# Patient Record
Sex: FEMALE | Race: WHITE | Employment: OTHER | URBAN - METROPOLITAN AREA
[De-identification: names, ages, dates, MRNs, and addresses within clinical notes are randomized per-mention and may not be internally consistent; named-entity substitution may affect disease eponyms.]

---

## 2023-02-09 ENCOUNTER — APPOINTMENT (OUTPATIENT)
Dept: CT IMAGING | Age: 88
DRG: 661 | End: 2023-02-09
Payer: MEDICARE

## 2023-02-09 ENCOUNTER — HOSPITAL ENCOUNTER (INPATIENT)
Age: 88
LOS: 3 days | Discharge: HOME | DRG: 661 | End: 2023-02-12
Attending: FAMILY MEDICINE | Admitting: FAMILY MEDICINE
Payer: MEDICARE

## 2023-02-09 ENCOUNTER — HOSPITAL ENCOUNTER (EMERGENCY)
Age: 88
Discharge: ANOTHER HOSPITAL | DRG: 661 | End: 2023-02-09
Attending: EMERGENCY MEDICINE
Payer: MEDICARE

## 2023-02-09 VITALS
BODY MASS INDEX: 21.97 KG/M2 | RESPIRATION RATE: 22 BRPM | HEART RATE: 109 BPM | SYSTOLIC BLOOD PRESSURE: 139 MMHG | TEMPERATURE: 98.4 F | DIASTOLIC BLOOD PRESSURE: 73 MMHG | OXYGEN SATURATION: 92 % | WEIGHT: 140 LBS | HEIGHT: 67 IN

## 2023-02-09 DIAGNOSIS — N13.2 HYDRONEPHROSIS WITH RENAL AND URETERAL CALCULUS OBSTRUCTION: Primary | ICD-10-CM

## 2023-02-09 PROBLEM — E78.5 HLD (HYPERLIPIDEMIA): Status: ACTIVE | Noted: 2023-02-09

## 2023-02-09 PROBLEM — E11.9 DIABETES MELLITUS, TYPE II (HCC): Status: ACTIVE | Noted: 2023-02-09

## 2023-02-09 PROBLEM — N13.8 URINARY TRACT OBSTRUCTION BY KIDNEY STONE: Status: ACTIVE | Noted: 2023-02-09

## 2023-02-09 PROBLEM — N20.0 URINARY TRACT OBSTRUCTION BY KIDNEY STONE: Status: ACTIVE | Noted: 2023-02-09

## 2023-02-09 PROBLEM — E03.9 HYPOTHYROIDISM: Status: ACTIVE | Noted: 2023-02-09

## 2023-02-09 LAB
ALBUMIN SERPL-MCNC: 3.9 G/DL (ref 3.2–4.6)
ALBUMIN/GLOB SERPL: 1.5 (ref 0.4–1.6)
ALP SERPL-CCNC: 79 U/L (ref 45–117)
ALT SERPL-CCNC: 8 U/L (ref 13–61)
ANION GAP SERPL CALC-SCNC: 9 MMOL/L (ref 2–11)
APPEARANCE UR: CLEAR
AST SERPL-CCNC: 12 U/L (ref 15–37)
BACTERIA URNS QL MICRO: ABNORMAL /HPF
BASOPHILS # BLD: 0 K/UL (ref 0–0.2)
BASOPHILS NFR BLD: 0 % (ref 0–2)
BILIRUB SERPL-MCNC: 0.6 MG/DL (ref 0.2–1.1)
BILIRUB UR QL: NEGATIVE
BUN SERPL-MCNC: 16 MG/DL (ref 7–18)
CALCIUM SERPL-MCNC: 10 MG/DL (ref 8.3–10.4)
CASTS URNS QL MICRO: 0 /LPF
CHLORIDE SERPL-SCNC: 101 MMOL/L (ref 98–107)
CO2 SERPL-SCNC: 32 MMOL/L (ref 21–32)
COLOR UR: YELLOW
CREAT SERPL-MCNC: 0.85 MG/DL (ref 0.6–1)
CRYSTALS URNS QL MICRO: 0 /LPF
DIFFERENTIAL METHOD BLD: ABNORMAL
EOSINOPHIL # BLD: 0.1 K/UL (ref 0–0.8)
EOSINOPHIL NFR BLD: 1 % (ref 0.5–7.8)
EPI CELLS #/AREA URNS HPF: ABNORMAL /HPF
ERYTHROCYTE [DISTWIDTH] IN BLOOD BY AUTOMATED COUNT: 13.6 % (ref 11.9–14.6)
GLOBULIN SER CALC-MCNC: 2.6 G/DL (ref 2.8–4.5)
GLUCOSE BLD STRIP.AUTO-MCNC: 241 MG/DL (ref 65–100)
GLUCOSE SERPL-MCNC: 175 MG/DL (ref 65–100)
GLUCOSE UR STRIP.AUTO-MCNC: NEGATIVE MG/DL
HCT VFR BLD AUTO: 39.5 % (ref 35.8–46.3)
HGB BLD-MCNC: 12.2 G/DL (ref 11.7–15.4)
HGB UR QL STRIP: ABNORMAL
IMM GRANULOCYTES # BLD AUTO: 0.1 K/UL (ref 0–0.5)
IMM GRANULOCYTES NFR BLD AUTO: 1 % (ref 0–5)
KETONES UR QL STRIP.AUTO: NEGATIVE MG/DL
LEUKOCYTE ESTERASE UR QL STRIP.AUTO: ABNORMAL
LIPASE SERPL-CCNC: 44 U/L (ref 13–60)
LYMPHOCYTES # BLD: 1 K/UL (ref 0.5–4.6)
LYMPHOCYTES NFR BLD: 10 % (ref 13–44)
MCH RBC QN AUTO: 27.6 PG (ref 26.1–32.9)
MCHC RBC AUTO-ENTMCNC: 30.9 G/DL (ref 31.4–35)
MCV RBC AUTO: 89.4 FL (ref 82–102)
MONOCYTES # BLD: 0.4 K/UL (ref 0.1–1.3)
MONOCYTES NFR BLD: 4 % (ref 4–12)
MUCOUS THREADS URNS QL MICRO: 0 /LPF
NEUTS SEG # BLD: 8.3 K/UL (ref 1.7–8.2)
NEUTS SEG NFR BLD: 84 % (ref 43–78)
NITRITE UR QL STRIP.AUTO: NEGATIVE
NRBC # BLD: 0 K/UL (ref 0–0.2)
OTHER OBSERVATIONS: ABNORMAL
PH UR STRIP: 7.5 (ref 5–9)
PLATELET # BLD AUTO: 236 K/UL (ref 150–450)
PMV BLD AUTO: 9.7 FL (ref 9.4–12.3)
POTASSIUM SERPL-SCNC: 4.2 MMOL/L (ref 3.5–5.1)
PROT SERPL-MCNC: 6.5 G/DL (ref 6.4–8.2)
PROT UR STRIP-MCNC: 30 MG/DL
RBC # BLD AUTO: 4.42 M/UL (ref 4.05–5.2)
RBC #/AREA URNS HPF: ABNORMAL /HPF
SERVICE CMNT-IMP: ABNORMAL
SODIUM SERPL-SCNC: 142 MMOL/L (ref 133–143)
SP GR UR REFRACTOMETRY: 1.02 (ref 1–1.02)
UROBILINOGEN UR QL STRIP.AUTO: 1 EU/DL (ref 0.2–1)
WBC # BLD AUTO: 9.9 K/UL (ref 4.3–11.1)
WBC URNS QL MICRO: ABNORMAL /HPF

## 2023-02-09 PROCEDURE — 83690 ASSAY OF LIPASE: CPT

## 2023-02-09 PROCEDURE — 6360000002 HC RX W HCPCS: Performed by: EMERGENCY MEDICINE

## 2023-02-09 PROCEDURE — 2580000003 HC RX 258: Performed by: FAMILY MEDICINE

## 2023-02-09 PROCEDURE — 87086 URINE CULTURE/COLONY COUNT: CPT

## 2023-02-09 PROCEDURE — 6370000000 HC RX 637 (ALT 250 FOR IP): Performed by: FAMILY MEDICINE

## 2023-02-09 PROCEDURE — 74177 CT ABD & PELVIS W/CONTRAST: CPT

## 2023-02-09 PROCEDURE — 85025 COMPLETE CBC W/AUTO DIFF WBC: CPT

## 2023-02-09 PROCEDURE — 2580000003 HC RX 258: Performed by: EMERGENCY MEDICINE

## 2023-02-09 PROCEDURE — 6360000004 HC RX CONTRAST MEDICATION: Performed by: EMERGENCY MEDICINE

## 2023-02-09 PROCEDURE — 6370000000 HC RX 637 (ALT 250 FOR IP): Performed by: EMERGENCY MEDICINE

## 2023-02-09 PROCEDURE — 81001 URINALYSIS AUTO W/SCOPE: CPT

## 2023-02-09 PROCEDURE — 1100000000 HC RM PRIVATE

## 2023-02-09 PROCEDURE — 6360000002 HC RX W HCPCS: Performed by: FAMILY MEDICINE

## 2023-02-09 PROCEDURE — 80053 COMPREHEN METABOLIC PANEL: CPT

## 2023-02-09 PROCEDURE — 82962 GLUCOSE BLOOD TEST: CPT

## 2023-02-09 RX ORDER — ATORVASTATIN CALCIUM 40 MG/1
40 TABLET, FILM COATED ORAL NIGHTLY
Status: DISCONTINUED | OUTPATIENT
Start: 2023-02-09 | End: 2023-02-12 | Stop reason: HOSPADM

## 2023-02-09 RX ORDER — DEXTROSE MONOHYDRATE 100 MG/ML
INJECTION, SOLUTION INTRAVENOUS CONTINUOUS PRN
Status: DISCONTINUED | OUTPATIENT
Start: 2023-02-09 | End: 2023-02-12 | Stop reason: HOSPADM

## 2023-02-09 RX ORDER — LEVOTHYROXINE SODIUM 88 UG/1
88 TABLET ORAL DAILY
COMMUNITY
Start: 2022-09-08

## 2023-02-09 RX ORDER — INSULIN LISPRO 100 [IU]/ML
0-8 INJECTION, SOLUTION INTRAVENOUS; SUBCUTANEOUS
Status: DISCONTINUED | OUTPATIENT
Start: 2023-02-09 | End: 2023-02-12 | Stop reason: HOSPADM

## 2023-02-09 RX ORDER — SODIUM CHLORIDE 9 MG/ML
INJECTION, SOLUTION INTRAVENOUS PRN
Status: DISCONTINUED | OUTPATIENT
Start: 2023-02-09 | End: 2023-02-12 | Stop reason: HOSPADM

## 2023-02-09 RX ORDER — ACETAMINOPHEN 325 MG/1
650 TABLET ORAL EVERY 6 HOURS PRN
Status: DISCONTINUED | OUTPATIENT
Start: 2023-02-09 | End: 2023-02-12 | Stop reason: HOSPADM

## 2023-02-09 RX ORDER — ONDANSETRON 4 MG/1
4 TABLET, ORALLY DISINTEGRATING ORAL EVERY 8 HOURS PRN
Status: DISCONTINUED | OUTPATIENT
Start: 2023-02-09 | End: 2023-02-12 | Stop reason: HOSPADM

## 2023-02-09 RX ORDER — SODIUM CHLORIDE 9 MG/ML
INJECTION, SOLUTION INTRAVENOUS CONTINUOUS
Status: DISCONTINUED | OUTPATIENT
Start: 2023-02-09 | End: 2023-02-09 | Stop reason: HOSPADM

## 2023-02-09 RX ORDER — ACETAMINOPHEN 650 MG/1
650 SUPPOSITORY RECTAL EVERY 6 HOURS PRN
Status: DISCONTINUED | OUTPATIENT
Start: 2023-02-09 | End: 2023-02-12 | Stop reason: HOSPADM

## 2023-02-09 RX ORDER — SODIUM CHLORIDE 9 MG/ML
INJECTION, SOLUTION INTRAVENOUS CONTINUOUS
Status: ACTIVE | OUTPATIENT
Start: 2023-02-10 | End: 2023-02-10

## 2023-02-09 RX ORDER — LANOLIN ALCOHOL/MO/W.PET/CERES
3 CREAM (GRAM) TOPICAL NIGHTLY PRN
Status: DISCONTINUED | OUTPATIENT
Start: 2023-02-09 | End: 2023-02-12 | Stop reason: HOSPADM

## 2023-02-09 RX ORDER — HYDRALAZINE HYDROCHLORIDE 20 MG/ML
10 INJECTION INTRAMUSCULAR; INTRAVENOUS EVERY 6 HOURS PRN
Status: DISCONTINUED | OUTPATIENT
Start: 2023-02-09 | End: 2023-02-12 | Stop reason: HOSPADM

## 2023-02-09 RX ORDER — LEVOTHYROXINE SODIUM 88 UG/1
88 TABLET ORAL DAILY
Status: DISCONTINUED | OUTPATIENT
Start: 2023-02-10 | End: 2023-02-12 | Stop reason: HOSPADM

## 2023-02-09 RX ORDER — SODIUM CHLORIDE 0.9 % (FLUSH) 0.9 %
5-40 SYRINGE (ML) INJECTION PRN
Status: DISCONTINUED | OUTPATIENT
Start: 2023-02-09 | End: 2023-02-12 | Stop reason: HOSPADM

## 2023-02-09 RX ORDER — POLYETHYLENE GLYCOL 3350 17 G/17G
17 POWDER, FOR SOLUTION ORAL DAILY PRN
Status: DISCONTINUED | OUTPATIENT
Start: 2023-02-09 | End: 2023-02-12 | Stop reason: HOSPADM

## 2023-02-09 RX ORDER — HYOSCYAMINE SULFATE 0.12 MG/1
0.12 TABLET SUBLINGUAL 4 TIMES DAILY PRN
COMMUNITY
Start: 2020-11-23

## 2023-02-09 RX ORDER — SODIUM CHLORIDE 0.9 % (FLUSH) 0.9 %
5-40 SYRINGE (ML) INJECTION 2 TIMES DAILY
Status: DISCONTINUED | OUTPATIENT
Start: 2023-02-09 | End: 2023-02-09 | Stop reason: HOSPADM

## 2023-02-09 RX ORDER — LISINOPRIL 2.5 MG/1
2.5 TABLET ORAL DAILY
COMMUNITY
Start: 2022-09-08

## 2023-02-09 RX ORDER — ONDANSETRON 2 MG/ML
4 INJECTION INTRAMUSCULAR; INTRAVENOUS
Status: COMPLETED | OUTPATIENT
Start: 2023-02-09 | End: 2023-02-09

## 2023-02-09 RX ORDER — MORPHINE SULFATE 2 MG/ML
2 INJECTION, SOLUTION INTRAMUSCULAR; INTRAVENOUS ONCE
Status: COMPLETED | OUTPATIENT
Start: 2023-02-09 | End: 2023-02-09

## 2023-02-09 RX ORDER — NIACIN 500 MG
TABLET ORAL
COMMUNITY
Start: 2015-04-27

## 2023-02-09 RX ORDER — TAMSULOSIN HYDROCHLORIDE 0.4 MG/1
0.4 CAPSULE ORAL
Status: COMPLETED | OUTPATIENT
Start: 2023-02-09 | End: 2023-02-09

## 2023-02-09 RX ORDER — ATORVASTATIN CALCIUM 40 MG/1
40 TABLET, FILM COATED ORAL DAILY
COMMUNITY
Start: 2022-09-08 | End: 2023-09-08

## 2023-02-09 RX ORDER — 0.9 % SODIUM CHLORIDE 0.9 %
100 INTRAVENOUS SOLUTION INTRAVENOUS ONCE
Status: COMPLETED | OUTPATIENT
Start: 2023-02-09 | End: 2023-02-09

## 2023-02-09 RX ORDER — MORPHINE SULFATE 2 MG/ML
0.5 INJECTION, SOLUTION INTRAMUSCULAR; INTRAVENOUS EVERY 4 HOURS PRN
Status: DISCONTINUED | OUTPATIENT
Start: 2023-02-09 | End: 2023-02-12 | Stop reason: HOSPADM

## 2023-02-09 RX ORDER — INSULIN LISPRO 100 [IU]/ML
0-4 INJECTION, SOLUTION INTRAVENOUS; SUBCUTANEOUS NIGHTLY
Status: DISCONTINUED | OUTPATIENT
Start: 2023-02-09 | End: 2023-02-12 | Stop reason: HOSPADM

## 2023-02-09 RX ORDER — SODIUM CHLORIDE 0.9 % (FLUSH) 0.9 %
5-40 SYRINGE (ML) INJECTION EVERY 12 HOURS SCHEDULED
Status: DISCONTINUED | OUTPATIENT
Start: 2023-02-09 | End: 2023-02-12 | Stop reason: HOSPADM

## 2023-02-09 RX ORDER — OXYCODONE HYDROCHLORIDE 5 MG/1
2.5 TABLET ORAL EVERY 4 HOURS PRN
Status: DISCONTINUED | OUTPATIENT
Start: 2023-02-09 | End: 2023-02-12 | Stop reason: HOSPADM

## 2023-02-09 RX ORDER — KETOROLAC TROMETHAMINE 15 MG/ML
15 INJECTION, SOLUTION INTRAMUSCULAR; INTRAVENOUS
Status: COMPLETED | OUTPATIENT
Start: 2023-02-09 | End: 2023-02-09

## 2023-02-09 RX ORDER — ONDANSETRON 2 MG/ML
4 INJECTION INTRAMUSCULAR; INTRAVENOUS EVERY 6 HOURS PRN
Status: DISCONTINUED | OUTPATIENT
Start: 2023-02-09 | End: 2023-02-12 | Stop reason: HOSPADM

## 2023-02-09 RX ORDER — HYDROMORPHONE HYDROCHLORIDE 1 MG/ML
0.5 INJECTION, SOLUTION INTRAMUSCULAR; INTRAVENOUS; SUBCUTANEOUS
Status: COMPLETED | OUTPATIENT
Start: 2023-02-09 | End: 2023-02-09

## 2023-02-09 RX ORDER — LANOLIN ALCOHOL/MO/W.PET/CERES
3 CREAM (GRAM) TOPICAL
COMMUNITY
Start: 2020-12-16

## 2023-02-09 RX ADMIN — SODIUM CHLORIDE: 9 INJECTION, SOLUTION INTRAVENOUS at 10:51

## 2023-02-09 RX ADMIN — KETOROLAC TROMETHAMINE 15 MG: 15 INJECTION, SOLUTION INTRAMUSCULAR; INTRAVENOUS at 11:58

## 2023-02-09 RX ADMIN — IOHEXOL 100 ML: 350 INJECTION, SOLUTION INTRAVENOUS at 12:09

## 2023-02-09 RX ADMIN — HYDROMORPHONE HYDROCHLORIDE 0.5 MG: 1 INJECTION, SOLUTION INTRAMUSCULAR; INTRAVENOUS; SUBCUTANEOUS at 16:40

## 2023-02-09 RX ADMIN — SODIUM CHLORIDE, PRESERVATIVE FREE 10 ML: 5 INJECTION INTRAVENOUS at 22:04

## 2023-02-09 RX ADMIN — CEFTRIAXONE 1000 MG: 1 INJECTION, POWDER, FOR SOLUTION INTRAMUSCULAR; INTRAVENOUS at 18:33

## 2023-02-09 RX ADMIN — ONDANSETRON 4 MG: 2 INJECTION INTRAMUSCULAR; INTRAVENOUS at 10:51

## 2023-02-09 RX ADMIN — SODIUM CHLORIDE 100 ML: 9 INJECTION, SOLUTION INTRAVENOUS at 12:09

## 2023-02-09 RX ADMIN — MORPHINE SULFATE 2 MG: 2 INJECTION, SOLUTION INTRAMUSCULAR; INTRAVENOUS at 10:51

## 2023-02-09 RX ADMIN — SODIUM CHLORIDE, PRESERVATIVE FREE 10 ML: 5 INJECTION INTRAVENOUS at 12:09

## 2023-02-09 RX ADMIN — ATORVASTATIN CALCIUM 40 MG: 40 TABLET, FILM COATED ORAL at 22:01

## 2023-02-09 RX ADMIN — TAMSULOSIN HYDROCHLORIDE 0.4 MG: 0.4 CAPSULE ORAL at 11:57

## 2023-02-09 RX ADMIN — DIATRIZOATE MEGLUMINE AND DIATRIZOATE SODIUM 15 ML: 660; 100 LIQUID ORAL; RECTAL at 10:51

## 2023-02-09 ASSESSMENT — PAIN SCALES - GENERAL
PAINLEVEL_OUTOF10: 9
PAINLEVEL_OUTOF10: 6
PAINLEVEL_OUTOF10: 5
PAINLEVEL_OUTOF10: 5
PAINLEVEL_OUTOF10: 7
PAINLEVEL_OUTOF10: 5
PAINLEVEL_OUTOF10: 7
PAINLEVEL_OUTOF10: 3

## 2023-02-09 ASSESSMENT — PAIN DESCRIPTION - ORIENTATION
ORIENTATION: RIGHT
ORIENTATION: RIGHT;LOWER

## 2023-02-09 ASSESSMENT — ENCOUNTER SYMPTOMS
BLOOD IN STOOL: 0
VOMITING: 0
NAUSEA: 0
DIARRHEA: 0
ABDOMINAL PAIN: 1

## 2023-02-09 ASSESSMENT — LIFESTYLE VARIABLES
HOW OFTEN DO YOU HAVE A DRINK CONTAINING ALCOHOL: NEVER
HOW MANY STANDARD DRINKS CONTAINING ALCOHOL DO YOU HAVE ON A TYPICAL DAY: PATIENT DOES NOT DRINK

## 2023-02-09 ASSESSMENT — PAIN - FUNCTIONAL ASSESSMENT
PAIN_FUNCTIONAL_ASSESSMENT: 0-10
PAIN_FUNCTIONAL_ASSESSMENT: 0-10

## 2023-02-09 ASSESSMENT — PAIN DESCRIPTION - LOCATION
LOCATION: ABDOMEN
LOCATION: ABDOMEN

## 2023-02-09 NOTE — ED NOTES
Checked on patient and family. No needs or concerns at this time. Updated about the process of transferring. Patient and family state understanding. Will continue to monitor.      Massiel Hayes RN  02/09/23 5803

## 2023-02-09 NOTE — ED NOTES
Attempted to call report for room 630. That nurse is unavailable at this time and will call back when available.      Brittany Lechuga RN  02/09/23 0915

## 2023-02-09 NOTE — ED TRIAGE NOTES
Pt states she has had RLQ pain since yesterday got worse today, denies NVD. Still has appendix. Has HTN and took meds today.

## 2023-02-09 NOTE — H&P
Hospitalist Admission History and Physical         NAME:            Flores Person Median    Age:                81 y.o.    :               1935    MRN:              [id number]    PCP: Marion Stephens MD    Consulting MD:    Treatment Team: Attending Provider: Maribell Sykes DO; Registered Nurse: Apolonia Rankin RN         No chief complaint on file. HPI:    Patient is a 81 y.o. female who presented to [facility] ER for cc right flank pain. I cannot obtain hx well due to patient being severely hearing impaired. She is even having difficulty understanding her brother in law. Hx of HLD, hypothyroidism, HTN, DM type II, and hearing impairment.     CT abdomen pelvis with contrast -   A 5-6 mm stone upper right ureter with mild hydronephrosis and   small perinephric urinoma. Social History     Social History Narrative    Not on file            Social History     Tobacco Use    Smoking status: Not on file    Smokeless tobacco: Not on file   Substance Use Topics    Alcohol use: Not on file            Social History     Substance and Sexual Activity   Drug Use Not on file                 No Known Allergies         Prior to Admission medications    Medication Sig Start Date End Date Taking? Authorizing Provider   atorvastatin (LIPITOR) 40 MG tablet Take 40 mg by mouth daily 22  Historical Provider, MD   Cholecalciferol 50 MCG (2000 UT) TABS Strength: 2000 intl units;  Form: tablet; SIG: take 1 tab(s) orally twice a day for 30 day(s) 4/27/15   Historical Provider, MD   cyanocobalamin 1000 MCG tablet Take 1,000 mcg by mouth daily 20   Historical Provider, MD   Hyoscyamine Sulfate SL 0.125 MG SUBL Place 0.125 mg under the tongue 4 times daily as needed 20   Historical Provider, MD   levothyroxine (SYNTHROID) 88 MCG tablet Take 88 mcg by mouth daily 22   Historical Provider, MD   lisinopril (PRINIVIL;ZESTRIL) 2.5 MG tablet Take 2.5 mg by mouth daily 22 Historical Provider, MD   melatonin 3 MG TABS tablet Take 3 mg by mouth 12/16/20   Historical Provider, MD   metFORMIN (GLUCOPHAGE) 500 MG tablet Take by mouth 9/8/22 3/9/23  Historical Provider, MD   niacin 500 MG tablet Strength: 500 mg; Form: ; SIG: take 1 tab  2 times a day 4/27/15   Historical Provider, MD                      Review of Systems  Cannot obtain due to hearing impairment. Objective:         Patient Vitals for the past 24 hrs:   Temp Pulse Resp BP SpO2   02/09/23 1726 98.2 °F (36.8 °C) (!) 113 20 134/63 94 %            No intake/output data recorded. No intake/output data recorded.          Data Review:   Recent Results (from the past 24 hour(s))   CBC with Diff    Collection Time: 02/09/23 10:35 AM   Result Value Ref Range    WBC 9.9 4.3 - 11.1 K/uL    RBC 4.42 4.05 - 5.20 M/uL    Hemoglobin 12.2 11.7 - 15.4 g/dL    Hematocrit 39.5 35.8 - 46.3 %    MCV 89.4 82.0 - 102.0 FL    MCH 27.6 26.1 - 32.9 PG    MCHC 30.9 (L) 31.4 - 35.0 g/dL    RDW 13.6 11.9 - 14.6 %    Platelets 211 729 - 931 K/uL    MPV 9.7 9.4 - 12.3 FL    nRBC 0.00 0.0 - 0.2 K/uL    Differential Type AUTOMATED      Seg Neutrophils 84 (H) 43 - 78 %    Lymphocytes 10 (L) 13 - 44 %    Monocytes 4 4.0 - 12.0 %    Eosinophils % 1 0.5 - 7.8 %    Basophils 0 0.0 - 2.0 %    Immature Granulocytes 1 0.0 - 5.0 %    Segs Absolute 8.3 (H) 1.7 - 8.2 K/UL    Absolute Lymph # 1.0 0.5 - 4.6 K/UL    Absolute Mono # 0.4 0.1 - 1.3 K/UL    Absolute Eos # 0.1 0.0 - 0.8 K/UL    Basophils Absolute 0.0 0.0 - 0.2 K/UL    Absolute Immature Granulocyte 0.1 0.0 - 0.5 K/UL   CMP    Collection Time: 02/09/23 10:35 AM   Result Value Ref Range    Sodium 142 133 - 143 mmol/L    Potassium 4.2 3.5 - 5.1 mmol/L    Chloride 101 98 - 107 mmol/L    CO2 32 21 - 32 mmol/L    Anion Gap 9 2 - 11 mmol/L    Glucose 175 (H) 65 - 100 mg/dL    BUN 16 7.0 - 18.0 MG/DL    Creatinine 0.85 0.6 - 1.0 MG/DL    Est, Glom Filt Rate >60 >60 ml/min/1.73m2    Calcium 10.0 8.3 - 10.4 MG/DL    Total Bilirubin 0.6 0.2 - 1.1 MG/DL    ALT 8 (L) 13.0 - 61.0 U/L    AST 12 (L) 15 - 37 U/L    Alk Phosphatase 79 45.0 - 117.0 U/L    Total Protein 6.5 6.4 - 8.2 g/dL    Albumin 3.9 3.2 - 4.6 g/dL    Globulin 2.6 (L) 2.8 - 4.5 g/dL    Albumin/Globulin Ratio 1.5 0.4 - 1.6     Lipase    Collection Time: 02/09/23 10:35 AM   Result Value Ref Range    Lipase 44 13 - 60 U/L   Urinalysis w rflx microscopic    Collection Time: 02/09/23 10:55 AM   Result Value Ref Range    Color, UA YELLOW      Appearance CLEAR      Specific Gravity, UA 1.025 (H) 1.001 - 1.023      pH, Urine 7.5 5.0 - 9.0      Protein, UA 30 (A) NEG mg/dL    Glucose, UA Negative mg/dL    Ketones, Urine Negative NEG mg/dL    Bilirubin Urine Negative NEG      Blood, Urine LARGE (A) NEG      Urobilinogen, Urine 1.0 0.2 - 1.0 EU/dL    Nitrite, Urine Negative NEG      Leukocyte Esterase, Urine SMALL (A) NEG     Urinalysis, Micro    Collection Time: 02/09/23 10:55 AM   Result Value Ref Range    WBC, UA 20-50 0 /hpf    RBC, UA  0 /hpf    Epithelial Cells UA 0-3 0 /hpf    BACTERIA, URINE 1+ (H) 0 /hpf    Casts 0 0 /lpf    Crystals 0 0 /LPF    Mucus, UA 0 0 /lpf    Other observations RESULTS VERIFIED MANUALLY              Physical Exam:         General:    Alert, cooperative, no distress, appears stated age. Eyes:    Conjunctivae/corneas clear. PERRL    Ears:    Normal     Nose:    Nares normal.    Mouth/Throat:    Poor dentition     Neck:    no JVD. Back:     deferred    Lungs:     Clear to auscultation bilaterally. Heart:    Regular rate and rhythm, S1, S2 normal    Abdomen:     Soft, non-tender. Bowel sounds normal.     Extremities:    Extremities normal, atraumatic, no cyanosis or edema. Skin:    Pale    Neurologic:    Extremely hard of hearing.  No acute distress         Assessment and Plan         Principal Problem:    Urinary tract obstruction by kidney stone  Active Problems:    Diabetes mellitus, type II ([address]    HLD (hyperlipidemia)    Hypothyroidism  Resolved Problems:    * No resolved hospital problems. *       Right upper ureter stone with mild hydronephrosis - No obvious distress and WBC normal. Place on Ceftriaxone and order urine culture. Spoken to urology who plans for stent placement in AM. NPO past midnight.      DM type II - SS    HLD - statin    Hypothyroidism - Synthroid     PPD    Due to hearing impairment, I did not address code status    DVT prophylaxis - SCDs  Signed By:    Leslie Nichols DO    February 9, 2023

## 2023-02-09 NOTE — ED NOTES
Patient connected to monitor and boosted up in bed. Patient and family are resting comfortably and have no requests or questions at this time.      Aliya Castaneda RN  02/09/23 9166

## 2023-02-09 NOTE — ED NOTES
TRANSFER - OUT REPORT:    Verbal report given to [employer] on [facility]. Buddy August  being transferred to room [place] [address]. Report consisted of patient's Situation, Background, Assessment and   Recommendations(SBAR). Information from the following report(s) Nurse Handoff Report, ED Encounter Summary and ED SBAR was reviewed with the receiving nurse. Lines:   Peripheral IV 02/09/23 Right Antecubital (Active)        Opportunity for questions and clarification was provided.       Patient transported with:  Joelle Silverio RN  02/09/23 4562

## 2023-02-09 NOTE — ED PROVIDER NOTES
Emergency Department Provider Note                   PCP:                Crescencio Barrow MD               Age: 81 y.o. Sex: female     No diagnosis found. DISPOSITION Decision To Transfer 02/09/2023 02:27:31 PM        Medical Decision Making  Patient is presenting with an acute undifferentiated abdominal pain in the right lower quadrant and right flank. Evaluation for intra-abdominal or renal pathology will be initiated. Amount and/or Complexity of Data Reviewed  Labs: ordered. Decision-making details documented in ED Course. Radiology: ordered and independent interpretation performed. Details: Review of CT prior to radiology interpretation demonstrates a large right-sided UPJ stone with hydronephrosis per my interpretation. Risk  Prescription drug management. Complexity of Problem: 1 acute illness with systemic symptoms. (4)  The patients assessment required an independent historian: I spoke with a family member. I have conducted an independent ordering and review of Labs. I have conducted an independent ordering and review of CT Scan. Considerations: Shared decision making was utilized in the care of this patient. Considerations: Hospitalization was considered. I discussed study results with another external provider. I discussed disposition with another provider. Patient was admitted I have communication with admitting physician.          Orders Placed This Encounter   Procedures    CT ABDOMEN PELVIS W IV CONTRAST Additional Contrast? Oral    CBC with Diff    CMP    Lipase    Urinalysis w rflx microscopic    Urinalysis, Micro    Diet NPO    Saline lock IV        Medications   0.9 % sodium chloride infusion ( IntraVENous New Bag 2/9/23 1051)   sodium chloride flush 0.9 % injection 5-40 mL (10 mLs IntraVENous Given 2/9/23 1209)   diatrizoate meglumine-sodium (GASTROGRAFIN) 66-10 % solution 15 mL (15 mLs Oral Given 2/9/23 1051)   morphine injection 2 mg (2 mg IntraVENous Given 2/9/23 1051)   ondansetron (ZOFRAN) injection 4 mg (4 mg IntraVENous Given 2/9/23 1051)   ketorolac (TORADOL) injection 15 mg (15 mg IntraVENous Given 2/9/23 1158)   tamsulosin (FLOMAX) capsule 0.4 mg (0.4 mg Oral Given 2/9/23 1157)   0.9 % sodium chloride bolus (100 mLs IntraVENous New Bag 2/9/23 1209)   iohexol (OMNIPAQUE 350) solution 100 mL (100 mLs IntraVENous Given 2/9/23 1209)       New Prescriptions    No medications on file        Jazmyn Justin is a 81 y.o. female who presents to the Emergency Department with chief complaint of    Chief Complaint   Patient presents with    Abdominal Pain      Patient presents emergency room complaining of right-sided abdominal pain that began acutely early this morning. She describes the pain as 9/10 in intensity but undifferentiated in character. No increasing or decreasing factors are noted. She points to the right side of the abdomen and right lower quadrant as the focus of the pain as well as some to the right side. She denies any fever or chills. She denies nausea or vomiting or diarrhea or constipation. She denies dysuria or hematuria. No history of kidney stones reported. Patient is status postcholecystectomy. The history is provided by the patient, medical records and a relative. Review of Systems   Constitutional:  Negative for chills and fever. Gastrointestinal:  Positive for abdominal pain. Negative for blood in stool, diarrhea, nausea and vomiting. All other systems reviewed and are negative. No past medical history on file. No past surgical history on file. No family history on file. Social History     Socioeconomic History    Marital status:          Patient has no known allergies. Previous Medications    ATORVASTATIN (LIPITOR) 40 MG TABLET    Take 40 mg by mouth daily    CHOLECALCIFEROL 50 MCG (2000 UT) TABS    Strength: 2000 intl units;  Form: tablet; SIG: take 1 tab(s) orally twice a day for 30 day(s)    CYANOCOBALAMIN 1000 MCG TABLET    Take 1,000 mcg by mouth daily    HYOSCYAMINE SULFATE SL 0.125 MG SUBL    Place 0.125 mg under the tongue 4 times daily as needed    LEVOTHYROXINE (SYNTHROID) 88 MCG TABLET    Take 88 mcg by mouth daily    LISINOPRIL (PRINIVIL;ZESTRIL) 2.5 MG TABLET    Take 2.5 mg by mouth daily    MELATONIN 3 MG TABS TABLET    Take 3 mg by mouth    METFORMIN (GLUCOPHAGE) 500 MG TABLET    Take by mouth    NIACIN 500 MG TABLET    Strength: 500 mg; Form: ; SIG: take 1 tab  2 times a day        Vitals signs and nursing note reviewed. Patient Vitals for the past 4 hrs:   Pulse Resp BP SpO2   02/09/23 1400 -- -- (!) 143/57 94 %   02/09/23 1230 99 18 (!) 151/60 93 %          Physical Exam  Vitals and nursing note reviewed. Constitutional:       General: She is not in acute distress. Appearance: Normal appearance. She is not ill-appearing, toxic-appearing or diaphoretic. HENT:      Head: Normocephalic and atraumatic. Eyes:      Extraocular Movements: Extraocular movements intact. Conjunctiva/sclera: Conjunctivae normal.      Pupils: Pupils are equal, round, and reactive to light. Cardiovascular:      Rate and Rhythm: Normal rate and regular rhythm. Pulmonary:      Effort: Pulmonary effort is normal.      Breath sounds: Normal breath sounds. Abdominal:      General: Abdomen is flat. There is no distension. Palpations: Abdomen is soft. Tenderness: There is abdominal tenderness. There is right CVA tenderness. There is no left CVA tenderness, guarding or rebound. Comments: Right-sided abdominal tenderness in the right lower quadrant is noted as well as right-sided flank tenderness. No rebound rigidity or guarding. Musculoskeletal:         General: Normal range of motion. Cervical back: Normal range of motion and neck supple. Skin:     General: Skin is warm and dry. Findings: No rash. Neurological:      General: No focal deficit present. Mental Status: She is alert and oriented to person, place, and time. Mental status is at baseline. Psychiatric:         Mood and Affect: Mood normal.         Behavior: Behavior normal.         Thought Content: Thought content normal.        Procedures    Results for orders placed or performed during the hospital encounter of 02/09/23   CT ABDOMEN PELVIS W IV CONTRAST Additional Contrast? Oral    Narrative    CT ABDOMEN AND PELVIS WITH CONTRAST. INDICATION: Right lower quadrant pain since yesterday. COMPARISON: None    TECHNIQUE: 5 mm axial scans from above the diaphragms to the pubic symphysis  following 15 cc oral Gastrografin and 100 cc Omnipaque 350 intravenous contrast  without acute complication. Intravenous contrast was given to increase the  sensitivity to acute inflammation. Radiation dose reduction techniques were  used for this study. Our CT scanners use one or more of the following:  Automated exposure control, adjustment of the mA and or kV according to patient  size, iterative reconstruction. FINDINGS:   -Lung Bases: The lung bases are grossly clear.    -Liver: Appears uniform. -Gallbladder: Absent  -Bile Ducts: Mildly prominent, not unusual status post cholecystectomy.    -Pancreas: Unremarkable patient.  -Spleen: Uniform and normal size.    -Stomach: Unremarkable. -Bowel: Normal caliber. Extensive sigmoid diverticulosis. No inflammatory  changes. Normal appendix.    -Adrenals: Are normal size.    -Kidneys:  Renal parenchyma enhances symmetrically. Mild right hydronephrosis. There are perirenal stranding densities on the right  Small cyst left kidney. Upper right ureter is dilated down to the point of a 5-6 mm stone, inferior  margin of the kidney, L3-4 disc space level.    -Urinary Bladder: Unremarkable. -Reproductive Organs: Large calcified uterine fibroid. .    -Lymph Nodes: No grossly enlarged retroperitoneal, mesenteric, or pelvic  adenopathy.  -Vasculature:  Aorta is normal caliber and calcified.  -Bones: Minimal scoliosis. Degenerative changes. .    -Other: No ascites. Impression    A 5-6 mm stone upper right ureter with mild hydronephrosis and  small perinephric urinoma.    CBC with Diff   Result Value Ref Range    WBC 9.9 4.3 - 11.1 K/uL    RBC 4.42 4.05 - 5.20 M/uL    Hemoglobin 12.2 11.7 - 15.4 g/dL    Hematocrit 39.5 35.8 - 46.3 %    MCV 89.4 82.0 - 102.0 FL    MCH 27.6 26.1 - 32.9 PG    MCHC 30.9 (L) 31.4 - 35.0 g/dL    RDW 13.6 11.9 - 14.6 %    Platelets 471 230 - 515 K/uL    MPV 9.7 9.4 - 12.3 FL    nRBC 0.00 0.0 - 0.2 K/uL    Differential Type AUTOMATED      Seg Neutrophils 84 (H) 43 - 78 %    Lymphocytes 10 (L) 13 - 44 %    Monocytes 4 4.0 - 12.0 %    Eosinophils % 1 0.5 - 7.8 %    Basophils 0 0.0 - 2.0 %    Immature Granulocytes 1 0.0 - 5.0 %    Segs Absolute 8.3 (H) 1.7 - 8.2 K/UL    Absolute Lymph # 1.0 0.5 - 4.6 K/UL    Absolute Mono # 0.4 0.1 - 1.3 K/UL    Absolute Eos # 0.1 0.0 - 0.8 K/UL    Basophils Absolute 0.0 0.0 - 0.2 K/UL    Absolute Immature Granulocyte 0.1 0.0 - 0.5 K/UL   CMP   Result Value Ref Range    Sodium 142 133 - 143 mmol/L    Potassium 4.2 3.5 - 5.1 mmol/L    Chloride 101 98 - 107 mmol/L    CO2 32 21 - 32 mmol/L    Anion Gap 9 2 - 11 mmol/L    Glucose 175 (H) 65 - 100 mg/dL    BUN 16 7.0 - 18.0 MG/DL    Creatinine 0.85 0.6 - 1.0 MG/DL    Est, Glom Filt Rate >60 >60 ml/min/1.73m2    Calcium 10.0 8.3 - 10.4 MG/DL    Total Bilirubin 0.6 0.2 - 1.1 MG/DL    ALT 8 (L) 13.0 - 61.0 U/L    AST 12 (L) 15 - 37 U/L    Alk Phosphatase 79 45.0 - 117.0 U/L    Total Protein 6.5 6.4 - 8.2 g/dL    Albumin 3.9 3.2 - 4.6 g/dL    Globulin 2.6 (L) 2.8 - 4.5 g/dL    Albumin/Globulin Ratio 1.5 0.4 - 1.6     Lipase   Result Value Ref Range    Lipase 44 13 - 60 U/L   Urinalysis w rflx microscopic   Result Value Ref Range    Color, UA YELLOW      Appearance CLEAR      Specific Gravity, UA 1.025 (H) 1.001 - 1.023      pH, Urine 7.5 5.0 - 9.0      Protein, UA 30 (A) NEG mg/dL Glucose, UA Negative mg/dL    Ketones, Urine Negative NEG mg/dL    Bilirubin Urine Negative NEG      Blood, Urine LARGE (A) NEG      Urobilinogen, Urine 1.0 0.2 - 1.0 EU/dL    Nitrite, Urine Negative NEG      Leukocyte Esterase, Urine SMALL (A) NEG     Urinalysis, Micro   Result Value Ref Range    WBC, UA 20-50 0 /hpf    RBC, UA  0 /hpf    Epithelial Cells UA 0-3 0 /hpf    BACTERIA, URINE 1+ (H) 0 /hpf    Casts 0 0 /lpf    Crystals 0 0 /LPF    Mucus, UA 0 0 /lpf    Other observations RESULTS VERIFIED MANUALLY          CT ABDOMEN PELVIS W IV CONTRAST Additional Contrast? Oral   Final Result   A 5-6 mm stone upper right ureter with mild hydronephrosis and   small perinephric urinoma. Voice dictation software was used during the making of this note. This software is not perfect and grammatical and other typographical errors may be present. This note has not been completely proofread for errors.      Gonzalo Salter DO  02/09/23 1324

## 2023-02-09 NOTE — ED NOTES
[facility] [address] [phone]       Anand De Jesus RN  02/09/23 2188

## 2023-02-10 ENCOUNTER — ANESTHESIA (OUTPATIENT)
Dept: SURGERY | Age: 88
End: 2023-02-10
Payer: MEDICARE

## 2023-02-10 ENCOUNTER — ANESTHESIA EVENT (OUTPATIENT)
Dept: SURGERY | Age: 88
End: 2023-02-10
Payer: MEDICARE

## 2023-02-10 LAB
ANION GAP SERPL CALC-SCNC: 11 MMOL/L (ref 2–11)
BASOPHILS # BLD: 0 K/UL (ref 0–0.2)
BASOPHILS NFR BLD: 1 % (ref 0–2)
BUN SERPL-MCNC: 19 MG/DL (ref 8–23)
CALCIUM SERPL-MCNC: 8.8 MG/DL (ref 8.3–10.4)
CHLORIDE SERPL-SCNC: 103 MMOL/L (ref 101–110)
CO2 SERPL-SCNC: 27 MMOL/L (ref 21–32)
CREAT SERPL-MCNC: 1 MG/DL (ref 0.6–1)
DIFFERENTIAL METHOD BLD: ABNORMAL
EOSINOPHIL # BLD: 0.2 K/UL (ref 0–0.8)
EOSINOPHIL NFR BLD: 2 % (ref 0.5–7.8)
ERYTHROCYTE [DISTWIDTH] IN BLOOD BY AUTOMATED COUNT: 13.8 % (ref 11.9–14.6)
GLUCOSE BLD STRIP.AUTO-MCNC: 119 MG/DL (ref 65–100)
GLUCOSE BLD STRIP.AUTO-MCNC: 129 MG/DL (ref 65–100)
GLUCOSE BLD STRIP.AUTO-MCNC: 131 MG/DL (ref 65–100)
GLUCOSE BLD STRIP.AUTO-MCNC: 141 MG/DL (ref 65–100)
GLUCOSE BLD STRIP.AUTO-MCNC: 172 MG/DL (ref 65–100)
GLUCOSE SERPL-MCNC: 150 MG/DL (ref 65–100)
HCT VFR BLD AUTO: 35.4 % (ref 35.8–46.3)
HGB BLD-MCNC: 10.6 G/DL (ref 11.7–15.4)
IMM GRANULOCYTES # BLD AUTO: 0 K/UL (ref 0–0.5)
IMM GRANULOCYTES NFR BLD AUTO: 1 % (ref 0–5)
LYMPHOCYTES # BLD: 0.1 K/UL (ref 0.5–4.6)
LYMPHOCYTES NFR BLD: 2 % (ref 13–44)
MCH RBC QN AUTO: 27 PG (ref 26.1–32.9)
MCHC RBC AUTO-ENTMCNC: 29.9 G/DL (ref 31.4–35)
MCV RBC AUTO: 90.1 FL (ref 82–102)
MONOCYTES # BLD: 0.1 K/UL (ref 0.1–1.3)
MONOCYTES NFR BLD: 1 % (ref 4–12)
NEUTS SEG # BLD: 6.8 K/UL (ref 1.7–8.2)
NEUTS SEG NFR BLD: 94 % (ref 43–78)
NRBC # BLD: 0 K/UL (ref 0–0.2)
PLATELET # BLD AUTO: 169 K/UL (ref 150–450)
PMV BLD AUTO: 10 FL (ref 9.4–12.3)
POTASSIUM SERPL-SCNC: 3.8 MMOL/L (ref 3.5–5.1)
RBC # BLD AUTO: 3.93 M/UL (ref 4.05–5.2)
SERVICE CMNT-IMP: ABNORMAL
SODIUM SERPL-SCNC: 141 MMOL/L (ref 133–143)
WBC # BLD AUTO: 7.3 K/UL (ref 4.3–11.1)

## 2023-02-10 PROCEDURE — C1894 INTRO/SHEATH, NON-LASER: HCPCS | Performed by: UROLOGY

## 2023-02-10 PROCEDURE — 2580000003 HC RX 258: Performed by: NURSE ANESTHETIST, CERTIFIED REGISTERED

## 2023-02-10 PROCEDURE — C1747 HC ENDOSCOPE, SINGLE, URINARY TRACT: HCPCS | Performed by: UROLOGY

## 2023-02-10 PROCEDURE — 52353 CYSTOURETERO W/LITHOTRIPSY: CPT | Performed by: UROLOGY

## 2023-02-10 PROCEDURE — 2580000003 HC RX 258: Performed by: FAMILY MEDICINE

## 2023-02-10 PROCEDURE — 7100000001 HC PACU RECOVERY - ADDTL 15 MIN: Performed by: UROLOGY

## 2023-02-10 PROCEDURE — 36415 COLL VENOUS BLD VENIPUNCTURE: CPT

## 2023-02-10 PROCEDURE — 0TC08ZZ EXTIRPATION OF MATTER FROM RIGHT KIDNEY, VIA NATURAL OR ARTIFICIAL OPENING ENDOSCOPIC: ICD-10-PCS | Performed by: UROLOGY

## 2023-02-10 PROCEDURE — 97162 PT EVAL MOD COMPLEX 30 MIN: CPT

## 2023-02-10 PROCEDURE — 82355 CALCULUS ANALYSIS QUAL: CPT

## 2023-02-10 PROCEDURE — 6360000002 HC RX W HCPCS: Performed by: NURSE ANESTHETIST, CERTIFIED REGISTERED

## 2023-02-10 PROCEDURE — 2500000003 HC RX 250 WO HCPCS: Performed by: NURSE ANESTHETIST, CERTIFIED REGISTERED

## 2023-02-10 PROCEDURE — 6370000000 HC RX 637 (ALT 250 FOR IP): Performed by: FAMILY MEDICINE

## 2023-02-10 PROCEDURE — 2720000010 HC SURG SUPPLY STERILE: Performed by: UROLOGY

## 2023-02-10 PROCEDURE — 7100000000 HC PACU RECOVERY - FIRST 15 MIN: Performed by: UROLOGY

## 2023-02-10 PROCEDURE — 99221 1ST HOSP IP/OBS SF/LOW 40: CPT | Performed by: UROLOGY

## 2023-02-10 PROCEDURE — 3600000004 HC SURGERY LEVEL 4 BASE: Performed by: UROLOGY

## 2023-02-10 PROCEDURE — 3700000001 HC ADD 15 MINUTES (ANESTHESIA): Performed by: UROLOGY

## 2023-02-10 PROCEDURE — 6360000004 HC RX CONTRAST MEDICATION: Performed by: UROLOGY

## 2023-02-10 PROCEDURE — BT1D1ZZ FLUOROSCOPY OF RIGHT KIDNEY, URETER AND BLADDER USING LOW OSMOLAR CONTRAST: ICD-10-PCS | Performed by: UROLOGY

## 2023-02-10 PROCEDURE — 80048 BASIC METABOLIC PNL TOTAL CA: CPT

## 2023-02-10 PROCEDURE — 97530 THERAPEUTIC ACTIVITIES: CPT

## 2023-02-10 PROCEDURE — 3600000014 HC SURGERY LEVEL 4 ADDTL 15MIN: Performed by: UROLOGY

## 2023-02-10 PROCEDURE — C2617 STENT, NON-COR, TEM W/O DEL: HCPCS | Performed by: UROLOGY

## 2023-02-10 PROCEDURE — 85025 COMPLETE CBC W/AUTO DIFF WBC: CPT

## 2023-02-10 PROCEDURE — 3700000000 HC ANESTHESIA ATTENDED CARE: Performed by: UROLOGY

## 2023-02-10 PROCEDURE — 82962 GLUCOSE BLOOD TEST: CPT

## 2023-02-10 PROCEDURE — 1100000000 HC RM PRIVATE

## 2023-02-10 PROCEDURE — 88300 SURGICAL PATH GROSS: CPT

## 2023-02-10 PROCEDURE — 0T768DZ DILATION OF RIGHT URETER WITH INTRALUMINAL DEVICE, VIA NATURAL OR ARTIFICIAL OPENING ENDOSCOPIC: ICD-10-PCS | Performed by: UROLOGY

## 2023-02-10 PROCEDURE — C1769 GUIDE WIRE: HCPCS | Performed by: UROLOGY

## 2023-02-10 PROCEDURE — 2709999900 HC NON-CHARGEABLE SUPPLY: Performed by: UROLOGY

## 2023-02-10 PROCEDURE — C1758 CATHETER, URETERAL: HCPCS | Performed by: UROLOGY

## 2023-02-10 DEVICE — URETERAL STENT
Type: IMPLANTABLE DEVICE | Site: URETER | Status: FUNCTIONAL
Brand: PERCUFLEX™ PLUS

## 2023-02-10 RX ORDER — NEOSTIGMINE METHYLSULFATE 1 MG/ML
INJECTION, SOLUTION INTRAVENOUS PRN
Status: DISCONTINUED | OUTPATIENT
Start: 2023-02-10 | End: 2023-02-10 | Stop reason: SDUPTHER

## 2023-02-10 RX ORDER — SODIUM CHLORIDE 0.9 % (FLUSH) 0.9 %
5-40 SYRINGE (ML) INJECTION EVERY 12 HOURS SCHEDULED
Status: CANCELLED | OUTPATIENT
Start: 2023-02-10

## 2023-02-10 RX ORDER — ONDANSETRON 2 MG/ML
INJECTION INTRAMUSCULAR; INTRAVENOUS PRN
Status: DISCONTINUED | OUTPATIENT
Start: 2023-02-10 | End: 2023-02-10 | Stop reason: SDUPTHER

## 2023-02-10 RX ORDER — MIDAZOLAM HYDROCHLORIDE 2 MG/2ML
2 INJECTION, SOLUTION INTRAMUSCULAR; INTRAVENOUS
Status: CANCELLED | OUTPATIENT
Start: 2023-02-10 | End: 2023-02-11

## 2023-02-10 RX ORDER — LIDOCAINE HYDROCHLORIDE 20 MG/ML
INJECTION, SOLUTION EPIDURAL; INFILTRATION; INTRACAUDAL; PERINEURAL PRN
Status: DISCONTINUED | OUTPATIENT
Start: 2023-02-10 | End: 2023-02-10 | Stop reason: SDUPTHER

## 2023-02-10 RX ORDER — FENTANYL CITRATE 50 UG/ML
100 INJECTION, SOLUTION INTRAMUSCULAR; INTRAVENOUS
Status: CANCELLED | OUTPATIENT
Start: 2023-02-10 | End: 2023-02-11

## 2023-02-10 RX ORDER — SODIUM CHLORIDE, SODIUM LACTATE, POTASSIUM CHLORIDE, CALCIUM CHLORIDE 600; 310; 30; 20 MG/100ML; MG/100ML; MG/100ML; MG/100ML
INJECTION, SOLUTION INTRAVENOUS CONTINUOUS
Status: DISCONTINUED | OUTPATIENT
Start: 2023-02-10 | End: 2023-02-11

## 2023-02-10 RX ORDER — GLYCOPYRROLATE 0.2 MG/ML
INJECTION INTRAMUSCULAR; INTRAVENOUS PRN
Status: DISCONTINUED | OUTPATIENT
Start: 2023-02-10 | End: 2023-02-10 | Stop reason: SDUPTHER

## 2023-02-10 RX ORDER — SCOLOPAMINE TRANSDERMAL SYSTEM 1 MG/1
1 PATCH, EXTENDED RELEASE TRANSDERMAL
Status: CANCELLED | OUTPATIENT
Start: 2023-02-10 | End: 2023-02-13

## 2023-02-10 RX ORDER — SODIUM CHLORIDE, SODIUM LACTATE, POTASSIUM CHLORIDE, CALCIUM CHLORIDE 600; 310; 30; 20 MG/100ML; MG/100ML; MG/100ML; MG/100ML
INJECTION, SOLUTION INTRAVENOUS CONTINUOUS PRN
Status: DISCONTINUED | OUTPATIENT
Start: 2023-02-10 | End: 2023-02-10 | Stop reason: SDUPTHER

## 2023-02-10 RX ORDER — OXYCODONE HYDROCHLORIDE 5 MG/1
5 TABLET ORAL
Status: DISCONTINUED | OUTPATIENT
Start: 2023-02-10 | End: 2023-02-10 | Stop reason: HOSPADM

## 2023-02-10 RX ORDER — DIPHENHYDRAMINE HYDROCHLORIDE 50 MG/ML
12.5 INJECTION INTRAMUSCULAR; INTRAVENOUS
Status: DISCONTINUED | OUTPATIENT
Start: 2023-02-10 | End: 2023-02-10 | Stop reason: HOSPADM

## 2023-02-10 RX ORDER — METOCLOPRAMIDE HYDROCHLORIDE 5 MG/ML
10 INJECTION INTRAMUSCULAR; INTRAVENOUS
Status: DISCONTINUED | OUTPATIENT
Start: 2023-02-10 | End: 2023-02-10 | Stop reason: HOSPADM

## 2023-02-10 RX ORDER — FENTANYL CITRATE 50 UG/ML
25 INJECTION, SOLUTION INTRAMUSCULAR; INTRAVENOUS EVERY 5 MIN PRN
Status: DISCONTINUED | OUTPATIENT
Start: 2023-02-10 | End: 2023-02-10 | Stop reason: HOSPADM

## 2023-02-10 RX ORDER — SODIUM CHLORIDE, SODIUM LACTATE, POTASSIUM CHLORIDE, CALCIUM CHLORIDE 600; 310; 30; 20 MG/100ML; MG/100ML; MG/100ML; MG/100ML
INJECTION, SOLUTION INTRAVENOUS CONTINUOUS
Status: CANCELLED | OUTPATIENT
Start: 2023-02-10

## 2023-02-10 RX ORDER — ONDANSETRON 2 MG/ML
4 INJECTION INTRAMUSCULAR; INTRAVENOUS
Status: DISCONTINUED | OUTPATIENT
Start: 2023-02-10 | End: 2023-02-10 | Stop reason: HOSPADM

## 2023-02-10 RX ORDER — ROCURONIUM BROMIDE 10 MG/ML
INJECTION, SOLUTION INTRAVENOUS PRN
Status: DISCONTINUED | OUTPATIENT
Start: 2023-02-10 | End: 2023-02-10 | Stop reason: SDUPTHER

## 2023-02-10 RX ORDER — HYDROMORPHONE HCL 110MG/55ML
0.5 PATIENT CONTROLLED ANALGESIA SYRINGE INTRAVENOUS EVERY 10 MIN PRN
Status: DISCONTINUED | OUTPATIENT
Start: 2023-02-10 | End: 2023-02-10 | Stop reason: HOSPADM

## 2023-02-10 RX ORDER — PROPOFOL 10 MG/ML
INJECTION, EMULSION INTRAVENOUS PRN
Status: DISCONTINUED | OUTPATIENT
Start: 2023-02-10 | End: 2023-02-10 | Stop reason: SDUPTHER

## 2023-02-10 RX ORDER — FENTANYL CITRATE 50 UG/ML
INJECTION, SOLUTION INTRAMUSCULAR; INTRAVENOUS PRN
Status: DISCONTINUED | OUTPATIENT
Start: 2023-02-10 | End: 2023-02-10 | Stop reason: SDUPTHER

## 2023-02-10 RX ORDER — EPHEDRINE SULFATE/0.9% NACL/PF 50 MG/5 ML
SYRINGE (ML) INTRAVENOUS PRN
Status: DISCONTINUED | OUTPATIENT
Start: 2023-02-10 | End: 2023-02-10 | Stop reason: SDUPTHER

## 2023-02-10 RX ORDER — DEXAMETHASONE SODIUM PHOSPHATE 4 MG/ML
INJECTION, SOLUTION INTRA-ARTICULAR; INTRALESIONAL; INTRAMUSCULAR; INTRAVENOUS; SOFT TISSUE PRN
Status: DISCONTINUED | OUTPATIENT
Start: 2023-02-10 | End: 2023-02-10 | Stop reason: SDUPTHER

## 2023-02-10 RX ADMIN — CEFTRIAXONE 1000 MG: 1 INJECTION, POWDER, FOR SOLUTION INTRAMUSCULAR; INTRAVENOUS at 17:26

## 2023-02-10 RX ADMIN — ONDANSETRON 4 MG: 2 INJECTION INTRAMUSCULAR; INTRAVENOUS at 17:29

## 2023-02-10 RX ADMIN — SODIUM CHLORIDE, PRESERVATIVE FREE 10 ML: 5 INJECTION INTRAVENOUS at 20:31

## 2023-02-10 RX ADMIN — ATORVASTATIN CALCIUM 40 MG: 40 TABLET, FILM COATED ORAL at 20:29

## 2023-02-10 RX ADMIN — PHENYLEPHRINE HYDROCHLORIDE 100 MCG: 10 INJECTION INTRAVENOUS at 17:27

## 2023-02-10 RX ADMIN — Medication 15 MG: at 17:37

## 2023-02-10 RX ADMIN — PROPOFOL 110 MG: 10 INJECTION, EMULSION INTRAVENOUS at 17:17

## 2023-02-10 RX ADMIN — PHENYLEPHRINE HYDROCHLORIDE 150 MCG: 10 INJECTION INTRAVENOUS at 17:31

## 2023-02-10 RX ADMIN — Medication 3 MG: at 18:01

## 2023-02-10 RX ADMIN — ROCURONIUM BROMIDE 30 MG: 50 INJECTION, SOLUTION INTRAVENOUS at 17:17

## 2023-02-10 RX ADMIN — DEXAMETHASONE SODIUM PHOSPHATE 4 MG: 4 INJECTION, SOLUTION INTRAMUSCULAR; INTRAVENOUS at 17:29

## 2023-02-10 RX ADMIN — GLYCOPYRROLATE 0.4 MG: 0.2 INJECTION INTRAMUSCULAR; INTRAVENOUS at 18:01

## 2023-02-10 RX ADMIN — SODIUM CHLORIDE: 9 INJECTION, SOLUTION INTRAVENOUS at 05:42

## 2023-02-10 RX ADMIN — LEVOTHYROXINE SODIUM 88 MCG: 0.09 TABLET ORAL at 09:24

## 2023-02-10 RX ADMIN — LIDOCAINE HYDROCHLORIDE 60 MG: 20 INJECTION, SOLUTION EPIDURAL; INFILTRATION; INTRACAUDAL; PERINEURAL at 17:17

## 2023-02-10 RX ADMIN — SODIUM CHLORIDE, PRESERVATIVE FREE 10 ML: 5 INJECTION INTRAVENOUS at 09:27

## 2023-02-10 RX ADMIN — SODIUM CHLORIDE, SODIUM LACTATE, POTASSIUM CHLORIDE, AND CALCIUM CHLORIDE: 600; 310; 30; 20 INJECTION, SOLUTION INTRAVENOUS at 17:12

## 2023-02-10 RX ADMIN — FENTANYL CITRATE 50 MCG: 50 INJECTION, SOLUTION INTRAMUSCULAR; INTRAVENOUS at 17:17

## 2023-02-10 ASSESSMENT — PAIN - FUNCTIONAL ASSESSMENT
PAIN_FUNCTIONAL_ASSESSMENT: 0-10
PAIN_FUNCTIONAL_ASSESSMENT: NONE - DENIES PAIN
PAIN_FUNCTIONAL_ASSESSMENT: ADULT NONVERBAL PAIN SCALE (NPVS)

## 2023-02-10 ASSESSMENT — ENCOUNTER SYMPTOMS
RESPIRATORY NEGATIVE: 1
GASTROINTESTINAL NEGATIVE: 1

## 2023-02-10 NOTE — H&P
Update History & Physical    The patient's History and Physical of February 10, 2023 was reviewed with the patient and I examined the patient. There was no change. The surgical site was confirmed by the patient and me. Plan: The risks, benefits, expected outcome, and alternative to the recommended procedure have been discussed with the patient. Patient understands and wants to proceed with the procedure. Electronically signed by Isabell Cook MD on 2/10/2023 at 5:03 PM    Urology Consult     Requesting MD:      Patient: Thomas Hightower MRN: [id number]  SSN: [id number]    YOB: 1935  Age: 81 y.o. Sex: female       Subjective:      Cesia Hightower is a 81 y.o. female who Patient is a 81 y.o. female who presented to [facility] ER for cc right flank pain. I cannot obtain hx well due to patient being severely hearing impaired. She is even having difficulty understanding her brother in law. Hx of HLD, hypothyroidism, HTN, DM type II, and hearing impairment. Urology consult for right kidney stone with hydronephrosis. Past Medical History   History reviewed. No pertinent past medical history. Past Surgical History   No past surgical history on file. Family History   No family history on file. Social History           Tobacco Use    Smoking status: Not on file    Smokeless tobacco: Not on file   Substance Use Topics    Alcohol use: Not on file      Home Medications           Prior to Admission medications    Medication Sig Start Date End Date Taking? Authorizing Provider   atorvastatin (LIPITOR) 40 MG tablet Take 40 mg by mouth daily 9/8/22 9/8/23   Historical Provider, MD   Cholecalciferol 50 MCG (2000 UT) TABS Strength: 2000 intl units;  Form: tablet; SIG: take 1 tab(s) orally twice a day for 30 day(s) 4/27/15     Historical Provider, MD   cyanocobalamin 1000 MCG tablet Take 1,000 mcg by mouth daily 12/16/20     Historical Provider, MD   Hyoscyamine Sulfate SL 0.125 MG SUBL Place 0.125 mg under the tongue 4 times daily as needed 11/23/20     Historical Provider, MD   levothyroxine (SYNTHROID) 88 MCG tablet Take 88 mcg by mouth daily 9/8/22     Historical Provider, MD   lisinopril (PRINIVIL;ZESTRIL) 2.5 MG tablet Take 2.5 mg by mouth daily 9/8/22     Historical Provider, MD   melatonin 3 MG TABS tablet Take 3 mg by mouth 12/16/20     Historical Provider, MD   metFORMIN (GLUCOPHAGE) 500 MG tablet Take by mouth 9/8/22 3/9/23   Historical Provider, MD   niacin 500 MG tablet Strength: 500 mg; Form: ; SIG: take 1 tab  2 times a day 4/27/15     Historical Provider, MD            No Known Allergies     Review of Systems:  A comprehensive review of systems was negative except for that written in the History of Present Illness. Objective:      Vitals          Vitals:     02/09/23 1944 02/10/23 0002 02/10/23 0329 02/10/23 0705   BP: (!) 125/53 (!) 113/56 (!) 119/48 (!) 114/57   Pulse: 96 79 83 99   Resp: 18 18 18 22   Temp: 98.2 °F (36.8 °C) 98.6 °F (37 °C) 99.9 °F (37.7 °C) 99 °F (37.2 °C)   TempSrc: Oral Oral Oral Oral   SpO2: 96% 98% 93% 94%   Weight:           Height:                    Physical Exam:  GENERAL ASSESSMENT: alert, oriented to person, place and time, no acute distress and no anxiety, depression or agitation. Very hard of hearing. Chest: normal work of breathing. CVS exam: normal rate, regular rhythm, normal S1, S2, no murmurs, rubs, clicks or gallops. ABDOMEN: not done  Neurological exam reveals alert, oriented, normal speech, no focal findings or movement disorder noted. FEMALE GENITOURINARY EXAM: not done  MALE GENITAL EXAM: not done     Assessment:      CT abdomen pelvis with contrast -   A 5-6 mm stone upper right ureter with mild hydronephrosis and   small perinephric urinoma. UA neg for nitrites. WBC 7.3, cr. 1.00. VSS. No fevers. Plan: Will schedule Right cystoscopy/ stent right lithotripsy today with Dr. Karen Carrera. NPO.      Signed By: Shahbaz Ferreira, NP-C      February 10, 2023

## 2023-02-10 NOTE — PROGRESS NOTES
[address] [facility]  [address] [place], [place] [id number]          12 Isabel Bennett  : 1935      INITIAL EVALUATION  Right flank pain      HPI: Cesia Rodriges is a 81 y.o. female with acute onset of right flank pain. She presented the [facility] emergency department and CT scan showed a 5 mm stone at the right UPJ with mild to moderate hydronephrosis and what may be a small forniceal rupture. She has had no fever or chills. She has had no nausea or vomiting. Initially her pain was uncontrolled and she was admitted to the hospitalist service downw. On admission her pain was well controlled and she remained afebrile. This morning she has some mild right flank tenderness but is otherwise comfortable. She has not had any fevers overnight. Her white count this morning is 7.3. Her creatinine yesterday was 0.85. Her urinalysis is nitrite negative. Urine culture is pending. Of note, patient is very hard of hearing making obtaining a history challenging. PMH:     History reviewed. No pertinent past medical history. PSH:    No past surgical history on file.     MEDs:    Current Facility-Administered Medications   Medication Dose Route Frequency Provider Last Rate Last Admin    sodium chloride flush 0.9 % injection 5-40 mL  5-40 mL IntraVENous 2 times per day Stephen Areola, DO   10 mL at 23    sodium chloride flush 0.9 % injection 5-40 mL  5-40 mL IntraVENous PRN Stephen Areola, DO        0.9 % sodium chloride infusion   IntraVENous PRN Stephen Areola, DO        ondansetron (ZOFRAN-ODT) disintegrating tablet 4 mg  4 mg Oral Q8H PRN Stephen Areola, DO        Or    [facility]ron [facility]) injection 4 mg  4 mg IntraVENous Q6H PRN Stephen Areola, DO        polyethylene glycol (GLYCOLAX) packet 17 g  17 g Oral Daily PRN Stephen Areola, DO        acetaminophen (TYLENOL) tablet 650 mg  650 mg Oral Q6H PRN Shahriar Yung Joy, DO        Or    acetaminophen (TYLENOL) suppository 650 mg  650 mg Rectal Q6H PRN Jackie Magana, DO        atorvastatin (LIPITOR) tablet 40 mg  40 mg Oral Nightly Jackie Magana, DO   40 mg at 02/09/23 2201    levothyroxine (SYNTHROID) tablet 88 mcg  88 mcg Oral Daily Jackie Magana, DO        melatonin tablet 3 mg  3 mg Oral Nightly PRN Jackie Magana, DO        insulin lispro (HUMALOG) injection vial 0-8 Units  0-8 Units SubCUTAneous TID WC Jackie Magana, DO        insulin lispro (HUMALOG) injection vial 0-4 Units  0-4 Units SubCUTAneous Nightly Jackie Magana, DO        glucose chewable tablet 16 g  4 tablet Oral PRN Jackie Magana, DO        dextrose bolus 10% 125 mL  125 mL IntraVENous PRN Jackie Magana, DO        Or    dextrose bolus 10% 250 mL  250 mL IntraVENous PRN Jackie Magana, DO        glucagon (rDNA) injection 1 mg  1 mg SubCUTAneous PRN Jackie Magana, DO        dextrose 10 % infusion   IntraVENous Continuous PRN Jackie Magana, DO        hydrALAZINE (APRESOLINE) injection 10 mg  10 mg IntraVENous Q6H PRN Jackie Magana, DO        cefTRIAXone (ROCEPHIN) 1,000 mg in sodium chloride 0.9 % 50 mL IVPB (mini-bag)  1,000 mg IntraVENous Q24H Jackie Magana, DO   Stopped at 02/09/23 1903    0.9 % sodium chloride infusion   IntraVENous Continuous Jackie Magana, DO 75 mL/hr at 02/10/23 0542 New Bag at 02/10/23 0542    oxyCODONE (ROXICODONE) immediate release tablet 2.5 mg  2.5 mg Oral Q4H PRN Jackie Magana, DO        morphine injection 0.5 mg  0.5 mg IntraVENous Q4H PRN Jackie Magana, DO        tuberculin injection 5 Units  5 Units IntraDERmal Once Jackie Magana, DO           ALLERGIES:     No Known Allergies    FH:     No family history on file.     SH:     Social History     Socioeconomic History    Marital status:      Spouse name: Not on file    Number of children: Not on file    Years of education: Not on file    Highest education level: Not on file Occupational History    Not on file   Tobacco Use    Smoking status: Not on file    Smokeless tobacco: Not on file   Substance and Sexual Activity    Alcohol use: Not on file    Drug use: Not on file    Sexual activity: Not on file   Other Topics Concern    Not on file   Social History Narrative    Not on file     Social Determinants of Health     Financial Resource Strain: Not on file   Food Insecurity: Not on file   Transportation Needs: Not on file   Physical Activity: Not on file   Stress: Not on file   Social Connections: Not on file   Intimate Partner Violence: Not on file   Housing Stability: Not on file       ROS:   Review of Systems   Constitutional: Negative. Negative for chills, fatigue and fever. Respiratory: Negative. Cardiovascular: Negative. Gastrointestinal: Negative. Genitourinary:  Positive for flank pain. Negative for difficulty urinating, dysuria, frequency, hematuria and urgency. All other systems reviewed and are negative. PHYSICAL EXAM  GENERAL: Well-groomed, well-nourished, pleasant 81 y.o. female, in no acute distress. BP (!) 114/57   Pulse 99   Temp 99 °F (37.2 °C) (Oral)   Resp 22   Ht 5' 7\" (1.702 m)   Wt 140 lb (63.5 kg)   SpO2 94%   BMI 21.93 kg/m²   General: well dressed, well nourished, no acute distress  Skin: no rashes  HEENT: Sclera are clear,normocephalic, atraumatic. no external lesions  Cardiovascular: Reg. Normal perfusion  Respiratory: normal respiratory effort, no JVD, no audible wheezing. Musculoskeletal: unremarkable with normal function. No embolic signs or cyanosis.    Neurologic exam: intact, no focal deficits, moves all 4 extremities  Psych: normal mood and affect, alert, oriented x 3  LE:  no edema  GI: soft, nontender, no masses, no CVA tenderness  Lymphatic: no axillary, inguinal, cervical or supraclavicular adenopathy  GENITOURINARY: mild right flank tenderness; abd soft    Labs:     Recent Labs     02/09/23  1035 02/10/23  0616   WBC 9.9 7.3   RBC 4.42 3.93*   HGB 12.2 10.6*   HCT 39.5 35.4*   MCV 89.4 90.1   MCH 27.6 27.0   MCHC 30.9* 29.9*   RDW 13.6 13.8    169   MPV 9.7 10.0          Recent Labs     02/09/23  1035      K 4.2      CO2 32   BUN 16   CREATININE 0.85   GLOB 2.6*         Imaging/Radiology:    XR Results (maximum last 3): No results found for this or any previous visit. CT Results (maximum last 3):  === 02/09/23 ===    CT ABDOMEN PELVIS W IV CONTRAST    - Narrative -  CT ABDOMEN AND PELVIS WITH CONTRAST. INDICATION: Right lower quadrant pain since yesterday. COMPARISON: None    TECHNIQUE: 5 mm axial scans from above the diaphragms to the pubic symphysis  following 15 cc oral Gastrografin and 100 cc Omnipaque 350 intravenous contrast  without acute complication. Intravenous contrast was given to increase the  sensitivity to acute inflammation. Radiation dose reduction techniques were  used for this study. Our CT scanners use one or more of the following:  Automated exposure control, adjustment of the mA and or kV according to patient  size, iterative reconstruction. FINDINGS:  -Lung Bases: The lung bases are grossly clear.    -Liver: Appears uniform. -Gallbladder: Absent  -Bile Ducts: Mildly prominent, not unusual status post cholecystectomy.    -Pancreas: Unremarkable patient.  -Spleen: Uniform and normal size.    -Stomach: Unremarkable. -Bowel: Normal caliber. Extensive sigmoid diverticulosis. No inflammatory  changes. Normal appendix.    -Adrenals: Are normal size.    -Kidneys:  Renal parenchyma enhances symmetrically. Mild right hydronephrosis. There are perirenal stranding densities on the right  Small cyst left kidney. Upper right ureter is dilated down to the point of a 5-6 mm stone, inferior  margin of the kidney, L3-4 disc space level.    -Urinary Bladder: Unremarkable. -Reproductive Organs: Large calcified uterine fibroid. .    -Lymph Nodes: No grossly enlarged retroperitoneal, mesenteric, or pelvic  adenopathy.  -Vasculature: Aorta is normal caliber and calcified.  -Bones: Minimal scoliosis. Degenerative changes. .    -Other: No ascites. - Impression -  A 5-6 mm stone upper right ureter with mild hydronephrosis and  small perinephric urinoma. ASSESSMENT: Matilde Chew. Kendy Macario is a 81 y.o. female with an obstructing 5 to 6 mm right UPJ stone with possible small forniceal rupture. Her pain is well controlled now and she does not appear to have a urinary tract infection or pyelonephritis. I discussed with her the possibility of observation and seeing if the stone passes. She would like to have it removed if possible. Currently she is n.p.o. We will check the operating room schedule and see if it is possible for her to have ureteroscopy with laser ablation and stent placement later today or tomorrow. If in fact this is a forniceal rupture we may just place a stent and at a later time come back for ureteroscopy. I discussed the possible risks of this procedure with the patient. PLAN:   -to OR possibly for right stent later today or Saturday-- will discuss with Dr. Jamar Dent  ________________________________________      I have seen and examined this patient. I have reviewed and edited the note started by the MA and agree with the outlined plan. Part of this note was written by using a voice dictation software. The note has been proof read but may still contain some grammatical/other typographical errors.       Selma Lowe  Urology

## 2023-02-10 NOTE — PROGRESS NOTES
Hospitalist Progress Note   Admit Date:  2023  5:25 PM   Name:  Tessa Rodriges   Age:  81 y.o. Sex:  female  :  1935   MRN:  [id number]   [place]  [facility] [place]    Presenting Complaint: No chief complaint on file. Reason(s) for Admission: Urinary tract obstruction by kidney stone [N20.0, N13.8]     Hospital Course:   Patient is a 81 y.o. female who presented to [facility] ER for cc right flank pain. I cannot obtain hx well due to patient being severely hearing impaired. She is even having difficulty understanding her brother in law. Hx of HLD, hypothyroidism, HTN, DM type II, and hearing impairment.      CT abdomen pelvis with contrast -   A 5-6 mm stone upper right ureter with mild hydronephrosis and   small perinephric urinoma. Subjective & 24hr Events (02/10/23): Patient feeling well but still having right flank pain. Assessment & Plan:     Principal Problem:    Urinary tract obstruction by kidney stone  Plan: plan for lithotripsy with stent placement today by Urology. Will continue iv abx  Active Problems:    Diabetes mellitus, type II ([address]  Plan: fsbs wnl, prn csi    HLD (hyperlipidemia)  Plan: statin    Hypothyroidism  Plan: synthroid      Anticipated discharge needs:      Pending hospital course    Diet:  Diet NPO  DVT PPx: scds  Code status: Full Code    Hospital Problems:  Principal Problem:    Urinary tract obstruction by kidney stone  Active Problems:    Diabetes mellitus, type II ([address]    HLD (hyperlipidemia)    Hypothyroidism  Resolved Problems:    * No resolved hospital problems.  *      Objective:   Patient Vitals for the past 24 hrs:   Temp Pulse Resp BP SpO2   02/10/23 1606 97.1 °F (36.2 °C) 85 16 (!) 141/60 97 %   02/10/23 1531 98.2 °F (36.8 °C) 88 20 121/65 97 %   02/10/23 1118 98.2 °F (36.8 °C) 88 20 (!) 99/55 96 %   02/10/23 0705 99 °F (37.2 °C) 99 22 (!) 114/57 94 %   02/10/23 0329 99.9 °F (37.7 °C) 83 18 (!) 119/48 93 %   02/10/23 0002 98.6 °F (37 °C) 79 18 (!) 113/56 98 %   02/09/23 1944 98.2 °F (36.8 °C) 96 18 (!) 125/53 96 %       Oxygen Therapy  SpO2: 97 %  Pulse Oximeter Device Mode: Intermittent  Pulse Oximeter Device Location: Left, Hand  O2 Device: None (Room air)    Estimated body mass index is 21.93 kg/m² as calculated from the following:    Height as of this encounter: 5' 7\" (1.702 m). Weight as of this encounter: 140 lb (63.5 kg). No intake or output data in the 24 hours ending 02/10/23 8628      Physical Exam:     Blood pressure (!) 141/60, pulse 85, temperature 97.1 °F (36.2 °C), temperature source Oral, resp. rate 16, height 5' 7\" (1.702 m), weight 140 lb (63.5 kg), SpO2 97 %. General:    Well nourished. Head:  Normocephalic, atraumatic  Eyes:  Sclerae appear normal.  Pupils equally round. ENT:  Nares appear normal.  Moist oral mucosa  Neck:  No restricted ROM. Trachea midline   CV:   RRR. No m/r/g. No jugular venous distension. Lungs:   CTAB. No wheezing, rhonchi, or rales. Symmetric expansion. Abdomen:   Soft, nontender, nondistended. Mild rt CVA tenderness  Extremities: No cyanosis or clubbing. No edema  Skin:     No rashes and normal coloration. Warm and dry. Neuro:  CN II-XII grossly intact. Psych:  Normal mood and affect.       I have personally reviewed labs and tests:  Recent Labs:  Recent Results (from the past 48 hour(s))   CBC with Diff    Collection Time: 02/09/23 10:35 AM   Result Value Ref Range    WBC 9.9 4.3 - 11.1 K/uL    RBC 4.42 4.05 - 5.20 M/uL    Hemoglobin 12.2 11.7 - 15.4 g/dL    Hematocrit 39.5 35.8 - 46.3 %    MCV 89.4 82.0 - 102.0 FL    MCH 27.6 26.1 - 32.9 PG    MCHC 30.9 (L) 31.4 - 35.0 g/dL    RDW 13.6 11.9 - 14.6 %    Platelets 745 833 - 001 K/uL    MPV 9.7 9.4 - 12.3 FL    nRBC 0.00 0.0 - 0.2 K/uL    Differential Type AUTOMATED      Seg Neutrophils 84 (H) 43 - 78 %    Lymphocytes 10 (L) 13 - 44 %    Monocytes 4 4.0 - 12.0 %    Eosinophils % 1 0.5 - 7.8 %    Basophils 0 0.0 - 2.0 % Immature Granulocytes 1 0.0 - 5.0 %    Segs Absolute 8.3 (H) 1.7 - 8.2 K/UL    Absolute Lymph # 1.0 0.5 - 4.6 K/UL    Absolute Mono # 0.4 0.1 - 1.3 K/UL    Absolute Eos # 0.1 0.0 - 0.8 K/UL    Basophils Absolute 0.0 0.0 - 0.2 K/UL    Absolute Immature Granulocyte 0.1 0.0 - 0.5 K/UL   CMP    Collection Time: 02/09/23 10:35 AM   Result Value Ref Range    Sodium 142 133 - 143 mmol/L    Potassium 4.2 3.5 - 5.1 mmol/L    Chloride 101 98 - 107 mmol/L    CO2 32 21 - 32 mmol/L    Anion Gap 9 2 - 11 mmol/L    Glucose 175 (H) 65 - 100 mg/dL    BUN 16 7.0 - 18.0 MG/DL    Creatinine 0.85 0.6 - 1.0 MG/DL    Est, Glom Filt Rate >60 >60 ml/min/1.73m2    Calcium 10.0 8.3 - 10.4 MG/DL    Total Bilirubin 0.6 0.2 - 1.1 MG/DL    ALT 8 (L) 13.0 - 61.0 U/L    AST 12 (L) 15 - 37 U/L    Alk Phosphatase 79 45.0 - 117.0 U/L    Total Protein 6.5 6.4 - 8.2 g/dL    Albumin 3.9 3.2 - 4.6 g/dL    Globulin 2.6 (L) 2.8 - 4.5 g/dL    Albumin/Globulin Ratio 1.5 0.4 - 1.6     Lipase    Collection Time: 02/09/23 10:35 AM   Result Value Ref Range    Lipase 44 13 - 60 U/L   Urinalysis w rflx microscopic    Collection Time: 02/09/23 10:55 AM   Result Value Ref Range    Color, UA YELLOW      Appearance CLEAR      Specific Gravity, UA 1.025 (H) 1.001 - 1.023      pH, Urine 7.5 5.0 - 9.0      Protein, UA 30 (A) NEG mg/dL    Glucose, UA Negative mg/dL    Ketones, Urine Negative NEG mg/dL    Bilirubin Urine Negative NEG      Blood, Urine LARGE (A) NEG      Urobilinogen, Urine 1.0 0.2 - 1.0 EU/dL    Nitrite, Urine Negative NEG      Leukocyte Esterase, Urine SMALL (A) NEG     Urinalysis, Micro    Collection Time: 02/09/23 10:55 AM   Result Value Ref Range    WBC, UA 20-50 0 /hpf    RBC, UA  0 /hpf    Epithelial Cells UA 0-3 0 /hpf    BACTERIA, URINE 1+ (H) 0 /hpf    Casts 0 0 /lpf    Crystals 0 0 /LPF    Mucus, UA 0 0 /lpf    Other observations RESULTS VERIFIED MANUALLY     POCT Glucose    Collection Time: 02/09/23 10:00 PM   Result Value Ref Range    POC Glucose 241 (H) 65 - 100 mg/dL    Performed by: Wil Diallo    Basic Metabolic Panel w/ Reflex to MG    Collection Time: 02/10/23  6:16 AM   Result Value Ref Range    Sodium 141 133 - 143 mmol/L    Potassium 3.8 3.5 - 5.1 mmol/L    Chloride 103 101 - 110 mmol/L    CO2 27 21 - 32 mmol/L    Anion Gap 11 2 - 11 mmol/L    Glucose 150 (H) 65 - 100 mg/dL    BUN 19 8 - 23 MG/DL    Creatinine 1.00 0.6 - 1.0 MG/DL    Est, Glom Filt Rate 55 (L) >60 ml/min/1.73m2    Calcium 8.8 8.3 - 10.4 MG/DL   CBC with Auto Differential    Collection Time: 02/10/23  6:16 AM   Result Value Ref Range    WBC 7.3 4.3 - 11.1 K/uL    RBC 3.93 (L) 4.05 - 5.2 M/uL    Hemoglobin 10.6 (L) 11.7 - 15.4 g/dL    Hematocrit 35.4 (L) 35.8 - 46.3 %    MCV 90.1 82 - 102 FL    MCH 27.0 26.1 - 32.9 PG    MCHC 29.9 (L) 31.4 - 35.0 g/dL    RDW 13.8 11.9 - 14.6 %    Platelets 146 653 - 095 K/uL    MPV 10.0 9.4 - 12.3 FL    nRBC 0.00 0.0 - 0.2 K/uL    Differential Type AUTOMATED      Seg Neutrophils 94 (H) 43 - 78 %    Lymphocytes 2 (L) 13 - 44 %    Monocytes 1 (L) 4.0 - 12.0 %    Eosinophils % 2 0.5 - 7.8 %    Basophils 1 0.0 - 2.0 %    Immature Granulocytes 1 0.0 - 5.0 %    Segs Absolute 6.8 1.7 - 8.2 K/UL    Absolute Lymph # 0.1 (L) 0.5 - 4.6 K/UL    Absolute Mono # 0.1 0.1 - 1.3 K/UL    Absolute Eos # 0.2 0.0 - 0.8 K/UL    Basophils Absolute 0.0 0.0 - 0.2 K/UL    Absolute Immature Granulocyte 0.0 0.0 - 0.5 K/UL   POCT Glucose    Collection Time: 02/10/23  7:07 AM   Result Value Ref Range    POC Glucose 141 (H) 65 - 100 mg/dL    Performed by: Begum (Holly)    POCT Glucose    Collection Time: 02/10/23 11:19 AM   Result Value Ref Range    POC Glucose 172 (H) 65 - 100 mg/dL    Performed by: Sarah)CALVIN    POCT Glucose    Collection Time: 02/10/23  3:32 PM   Result Value Ref Range    POC Glucose 131 (H) 65 - 100 mg/dL    Performed by: Begum (Holly)        I have personally reviewed imaging studies:  Other Studies:  No orders to display Current Meds:  Current Facility-Administered Medications   Medication Dose Route Frequency    iopamidol (ISOVUE-300) 61 % injection    PRN    sodium chloride flush 0.9 % injection 5-40 mL  5-40 mL IntraVENous 2 times per day    sodium chloride flush 0.9 % injection 5-40 mL  5-40 mL IntraVENous PRN    0.9 % sodium chloride infusion   IntraVENous PRN    ondansetron (ZOFRAN-ODT) disintegrating tablet 4 mg  4 mg Oral Q8H PRN    Or    ondansetron (ZOFRAN) injection 4 mg  4 mg IntraVENous Q6H PRN    polyethylene glycol (GLYCOLAX) packet 17 g  17 g Oral Daily PRN    acetaminophen (TYLENOL) tablet 650 mg  650 mg Oral Q6H PRN    Or    acetaminophen (TYLENOL) suppository 650 mg  650 mg Rectal Q6H PRN    atorvastatin (LIPITOR) tablet 40 mg  40 mg Oral Nightly    levothyroxine (SYNTHROID) tablet 88 mcg  88 mcg Oral Daily    melatonin tablet 3 mg  3 mg Oral Nightly PRN    insulin lispro (HUMALOG) injection vial 0-8 Units  0-8 Units SubCUTAneous TID WC    insulin lispro (HUMALOG) injection vial 0-4 Units  0-4 Units SubCUTAneous Nightly    glucose chewable tablet 16 g  4 tablet Oral PRN    dextrose bolus 10% 125 mL  125 mL IntraVENous PRN    Or    dextrose bolus 10% 250 mL  250 mL IntraVENous PRN    glucagon (rDNA) injection 1 mg  1 mg SubCUTAneous PRN    dextrose 10 % infusion   IntraVENous Continuous PRN    hydrALAZINE (APRESOLINE) injection 10 mg  10 mg IntraVENous Q6H PRN    cefTRIAXone (ROCEPHIN) 1,000 mg in sodium chloride 0.9 % 50 mL IVPB (mini-bag)  1,000 mg IntraVENous Q24H    oxyCODONE (ROXICODONE) immediate release tablet 2.5 mg  2.5 mg Oral Q4H PRN    morphine injection 0.5 mg  0.5 mg IntraVENous Q4H PRN    tuberculin injection 5 Units  5 Units IntraDERmal Once     Facility-Administered Medications Ordered in Other Encounters   Medication Dose Route Frequency    propofol injection   IntraVENous PRN    lidocaine PF 2 % injection   IntraVENous PRN    fentaNYL (SUBLIMAZE) injection   IntraVENous PRN    rocuronium [facility]) injection   IntraVENous PRN    lactated ringers IV soln infusion   IntraVENous Continuous PRN    phenylephrine (YONATHAN-SYNEPHRINE) 50 mg in sodium chloride 0.9 % 250 mL infusion   IntraVENous Continuous PRN    dexamethasone (DECADRON) injection   IntraVENous PRN    ondansetron (ZOFRAN) injection   IntraVENous PRN    ePHEDrine injection   IntraVENous PRN       Signed:  Juanito Wu MD    Part of this note may have been written by using a voice dictation software. The note has been proof read but may still contain some grammatical/other typographical errors.

## 2023-02-10 NOTE — PROGRESS NOTES
TRANSFER - OUT REPORT:    [facility]       Report consisted of patient's Situation, Background, Assessment and   Recommendations(SBAR). Information from the following report(s) Nurse Handoff Report was reviewed with the receiving nurse. Brandon Assessment: Presents to emergency department  because of falls (Syncope, seizure, or loss of consciousness): No, Age > 79: Yes, Altered Mental Status, Intoxication with alcohol or substance confusion (Disorientation, impaired judgment, poor safety awaremess, or inability to follow instructions): No, Impaired Mobility: Ambulates or transfers with assistive devices or assistance; Unable to ambulate or transer.: No, Nursing Judgement: Yes  Lines:   Peripheral IV 02/10/23 Distal;Left;Ventral Forearm (Active)   Site Assessment Clean, dry & intact 02/10/23 1446   Line Status Infusing 02/10/23 [place] Connections checked and tightened 02/10/23 1446   Phlebitis Assessment No symptoms 02/10/23 1446   Infiltration Assessment 0 02/10/23 1446   Alcohol Cap Used No 02/10/23 1446   Dressing Type Transparent 02/10/23 1446        Opportunity for questions and clarification was provided.       Patient transported with:  [employer]

## 2023-02-10 NOTE — PROGRESS NOTES
ACUTE PHYSICAL THERAPY GOALS:   (Developed with and agreed upon by patient and/or caregiver. )  LTG:  (1.)Ms. Shayan Zeng will move from supine to sit and sit to supine , scoot up and down, and roll side to side in bed with INDEPENDENCE within 7-10 treatment day(s). (2.)Ms. Shayan Zeng will transfer from bed to chair and chair to bed with INDEPENDENCE using the least restrictive device within 7-10 treatment day(s). (3.)Ms. hSayan Zeng will ambulate with INDEPENDENCE for 1,000 feet with the least restrictive device within 7-10 treatment day(s). (4.)Ms. Shayan Zeng will perform static/dynamic standing balance exercises for 10 minutes with SUPERVISION in order to improve functional mobility within 7-10 treatment day(s). (5.)Ms. Shayan Zeng will ascend/descend 3 steps with handrail with MODIFIED INDEPENDENCE in order to improve functional mobility within 7-10 treatment day(s). ________________________________________________________________________________________________      PHYSICAL THERAPY Initial Assessment, Daily Note, and PM  (Link to Caseload Tracking: PT Visit Days : 1  Acknowledge Orders  Time In/Out  PT Charge Capture  GenKyoTex TERENCE Zeng is a 81 y.o. female   PRIMARY DIAGNOSIS: Urinary tract obstruction by kidney stone  Urinary tract obstruction by kidney stone [N20.0, N13.8]  Procedure(s) (LRB):  CYSTOSCOPY, RIGHT URETEROSCOPY, LASER, LITHO AND STENT (Right)     Reason for Referral: Difficulty in walking, Not elsewhere classified (R26.2)  Other abnormalities of gait and mobility (R26.89)  Inpatient: Payor: MEDICARE / Plan: MEDICARE PART A AND B / Product Type: *No Product type* /     ASSESSMENT:     REHAB RECOMMENDATIONS:   Recommendation to date pending progress:  Setting:  Home Health Therapy    Equipment:    To Be Determined     ASSESSMENT:  Ms. Shayan Zeng agreeable to therapy session and was able to stand up with relatively little difficulty with S-CGA.  She ambulated 250 feet around hallway with no AD with only S-CGA and slight unsteadiness. Upon returning to room she was able to perform several exercises at the bedside. Pt is hard of hearing and often needed instructions repeated. Pt may benefit from skilled PT to address the below listed deficits to improve her safety and independence prior to discharge.      [facility] [address] [address] AM-PAC 6 Clicks Basic Mobility Inpatient Short Form  AM-PAC Basic Mobility - Inpatient   How much help is needed turning from your back to your side while in a flat bed without using bedrails?: None  How much help is needed moving from lying on your back to sitting on the side of a flat bed without using bedrails?: None  How much help is needed moving to and from a bed to a chair?: A Little  How much help is needed standing up from a chair using your arms?: A Little  How much help is needed walking in hospital room?: A Little  How much help is needed climbing 3-5 steps with a railing?: A Lot  AM-PAC Inpatient Mobility Raw Score : 19  AM-PAC Inpatient T-Scale Score : 45.44  Mobility Inpatient CMS 0-100% Score: 41.77  Mobility Inpatient CMS G-Code Modifier : CK    SUBJECTIVE:   Ms. Yanelis Perez states, \"okay\"     Social/Functional Lives With: Alone  Type of Home: House  Home Layout: One level  Home Access: Stairs to enter with rails  Home Equipment: Dale Webster, [address] Help From: Family  Active : Yes  Mode of Transportation: Car  Occupation: Retired    OBJECTIVE:     PAIN: Arina Chimera / O2: PRECAUTION / Soham Frames / Adam Stapler:   Pre Treatment:   Pain Assessment: 0-10  Pain Level:  (no pain verbalized)      Post Treatment: no pain verbalized Vitals        Oxygen      None    RESTRICTIONS/PRECAUTIONS:  Restrictions/Precautions: None                 GROSS EVALUATION: Intact Impaired (Comments):   AROM [x]     PROM [x]    Strength []  Decreased B LE strength/endurance   Balance []  Good sitting balance; fair static/dynamic standing balance   Posture [] Forward Head  Rounded Shoulders  Thoracic Kyphosis  Trunk Flexion   Sensation []     Coordination []      Tone []     Edema []    Activity Tolerance []  decreased    []      COGNITION/  PERCEPTION: Intact Impaired (Comments):   Orientation [x]     Vision [x]     Hearing []  Hard of hearing   Cognition  [x]       MOBILITY: I Mod I S SBA CGA Min Mod Max Total  NT x2 Comments:   Bed Mobility    Rolling [] [] [x] [] [] [] [] [] [] [] []    Supine to Sit [] [] [x] [] [] [] [] [] [] [] []    Scooting [] [] [x] [] [] [] [] [] [] [] []    Sit to Supine [] [] [x] [] [] [] [] [] [] [] []    Transfers    Sit to Stand [] [] [x] [] [x] [] [] [] [] [] [] Pushes up to stand, reaches back to sit   Bed to Chair [] [] [] [] [] [] [] [] [] [x] []    Stand to Sit [] [] [x] [] [x] [] [] [] [] [] []     [] [] [] [] [] [] [] [] [] [] []    I=Independent, Mod I=Modified Independent, S=Supervision, SBA=Standby Assistance, CGA=Contact Guard Assistance,   Min=Minimal Assistance, Mod=Moderate Assistance, Max=Maximal Assistance, Total=Total Assistance, NT=Not Tested    GAIT: I Mod I S SBA CGA Min Mod Max Total  NT x2 Comments:   Level of Assistance [] [] [x] [] [x] [] [] [] [] [] [] Cues for longer step length and upright posture   Distance 250 feet    DME None    Gait Quality Altered arm swing, Decreased step clearance, Decreased step length, Narrow base of support, Path deviations , Trunk flexion, and Trunk sway increased    Weightbearing Status Restrictions/Precautions  Restrictions/Precautions: None    Stairs      I=Independent, Mod I=Modified Independent, S=Supervision, SBA=Standby Assistance, CGA=Contact Guard Assistance,   Min=Minimal Assistance, Mod=Moderate Assistance, Max=Maximal Assistance, Total=Total Assistance, NT=Not Tested    PLAN:   FREQUENCY AND DURATION: 3 times/week for duration of hospital stay or until stated goals are met, whichever comes first.    THERAPY PROGNOSIS: Fair    PROBLEM LIST:   (Skilled intervention is medically necessary to address:)  Decreased ADL/Functional Activities  Decreased Activity Tolerance  Decreased AROM/PROM  Decreased Balance  Decreased Gait Ability  Decreased Safety Awareness  Decreased Strength  Decreased Transfer Abilities INTERVENTIONS PLANNED:   (Benefits and precautions of physical therapy have been discussed with the patient.)  Self Care Training  Therapeutic Activity  Therapeutic Exercise/HEP  Neuromuscular Re-education  Gait Training       TREATMENT:   EVALUATION: MODERATE COMPLEXITY: (Untimed Charge)    TREATMENT:   Therapeutic Activity (8 Minutes): Therapeutic activity included Rolling, Supine to Sit, Sit to Supine, Scooting, Ambulation on level ground, Standing balance, and exercises below to improve functional Activity tolerance, Balance, Mobility, Strength, and ROM.     TREATMENT GRID:   Date:  2/10/2023 Date:   Date:     Activity/Exercise Parameters Parameters Parameters   Sitting LE marching 20 reps/1 set with cues for slow increased AROM     Sit to stands 10 reps/1 set with cues for controlled descent                                       AFTER TREATMENT PRECAUTIONS: Bed, Bed/Chair Locked, Call light within reach, and Needs within reach    INTERDISCIPLINARY COLLABORATION:  RN/ PCT and PT/ PTA    EDUCATION: Education Given To: Patient  Education Provided: Role of Therapy;Plan of Care  Education Method: Demonstration;Verbal  Barriers to Learning: Hearing  Education Outcome: Verbalized understanding;Demonstrated understanding    TIME IN/OUT:  Time In: 1330  Time Out: [address] [facility]  Minutes: [address], PT

## 2023-02-10 NOTE — PROGRESS NOTES
TRANSFER - IN REPORT:    Verbal report received from [place], [place] on [facility]. Drew Brunner  being received from PACU for routine progression of patient care      Report consisted of patient's Situation, Background, Assessment and   Recommendations(SBAR). Information from the following report(s) Nurse Handoff Report was reviewed with the receiving nurse. Opportunity for questions and clarification was provided. Assessment completed upon patient's arrival to unit and care assumed.

## 2023-02-10 NOTE — ACP (ADVANCE CARE PLANNING)
Advance Care Planning     General Advance Care Planning (ACP) Conversation    Date of Conversation: 2/9/2023  Conducted with: Patient with Decision Making Capacity    Healthcare Decision Maker:  Pt's Siblings (total of 4 Living)  Patient is a  and has no children. Today we documented Decision Maker(s) consistent with Legal Next of Kin hierarchy.     Content/Action Overview:    Reviewed DNR/DNI and patient elects Full Code (Attempt Resuscitation)    Length of Voluntary ACP Conversation in minutes:  <16 minutes (Non-Billable)    RIGO Porras

## 2023-02-10 NOTE — BRIEF OP NOTE
Brief Postoperative Note      Patient: Warren Santillan  YOB: 1935  MRN: [id number]    Date of Procedure: 2/10/2023    Pre-Op Diagnosis: Right ureteral stone [N20.1]    Post-Op Diagnosis: Same       Procedure(s):  CYSTOSCOPY, RIGHT URETEROSCOPY, LASER, LITHO AND STENT / RIGHT RETROGRADE Pyelogram, Intra-operative Interpretation of Fluoroscopy < 1 hour    Surgeon(s):  Marshall David MD    Assistant:  * No surgical staff found *    Anesthesia: General    Estimated Blood Loss (mL): Minimal    Complications: None    Specimens:   ID Type Source Tests Collected by Time Destination   1 : RIGHT KIDNEY STONES  Stone (Calculus) Kidney STONE ANALYSIS Marshall David MD 2/10/2023 1745        Implants:  Implant Name Type Inv. Item Serial No.  Lot No. LRB No. Used Action   STENT URET 6FR L26CM HYDR+ PGTL [facility] - [id number]  [facility] 6FR L26CM HYDR+ PGTL TAPR TIP GRAD BLDR MRK [facility]- [id number] Right 1 Implanted         Drains: * No LDAs found *    Findings: 3 mm impacted R UPJ stone. Right Retrograde Pyelogram Interpretation:   No extravasation of contrast to suggest forniceal rupture. Right hydronephrosis behind proximal right ureter stone.      Electronically signed by Marshall David MD on 2/10/2023 at 6:23 PM

## 2023-02-10 NOTE — CONSULTS
Urology Consult    Requesting MD:     Patient: Laura Ulloa MRN: [id number]  SSN: [id number]    YOB: 1935  Age: 81 y.o. Sex: female      Subjective:      Cesia Lawson Laser is a 81 y.o. female who Patient is a 81 y.o. female who presented to [facility] ER for cc right flank pain. I cannot obtain hx well due to patient being severely hearing impaired. She is even having difficulty understanding her brother in law. Hx of HLD, hypothyroidism, HTN, DM type II, and hearing impairment. Urology consult for right kidney stone with hydronephrosis. History reviewed. No pertinent past medical history. No past surgical history on file. No family history on file. Social History     Tobacco Use    Smoking status: Not on file    Smokeless tobacco: Not on file   Substance Use Topics    Alcohol use: Not on file      Prior to Admission medications    Medication Sig Start Date End Date Taking? Authorizing Provider   atorvastatin (LIPITOR) 40 MG tablet Take 40 mg by mouth daily 9/8/22 9/8/23  Historical Provider, MD   Cholecalciferol 50 MCG (2000 UT) TABS Strength: 2000 intl units;  Form: tablet; SIG: take 1 tab(s) orally twice a day for 30 day(s) 4/27/15   Historical Provider, MD   cyanocobalamin 1000 MCG tablet Take 1,000 mcg by mouth daily 12/16/20   Historical Provider, MD   Hyoscyamine Sulfate SL 0.125 MG SUBL Place 0.125 mg under the tongue 4 times daily as needed 11/23/20   Historical Provider, MD   levothyroxine (SYNTHROID) 88 MCG tablet Take 88 mcg by mouth daily 9/8/22   Historical Provider, MD   lisinopril (PRINIVIL;ZESTRIL) 2.5 MG tablet Take 2.5 mg by mouth daily 9/8/22   Historical Provider, MD   melatonin 3 MG TABS tablet Take 3 mg by mouth 12/16/20   Historical Provider, MD   metFORMIN (GLUCOPHAGE) 500 MG tablet Take by mouth 9/8/22 3/9/23  Historical Provider, MD   niacin 500 MG tablet Strength: 500 mg; Form: ; SIG: take 1 tab  2 times a day 4/27/15   Historical Provider, MD        No Known Allergies    Review of Systems:  A comprehensive review of systems was negative except for that written in the History of Present Illness. Objective:     Vitals:    02/09/23 1944 02/10/23 0002 02/10/23 0329 02/10/23 0705   BP: (!) 125/53 (!) 113/56 (!) 119/48 (!) 114/57   Pulse: 96 79 83 99   Resp: 18 18 18 22   Temp: 98.2 °F (36.8 °C) 98.6 °F (37 °C) 99.9 °F (37.7 °C) 99 °F (37.2 °C)   TempSrc: Oral Oral Oral Oral   SpO2: 96% 98% 93% 94%   Weight:       Height:            Physical Exam:  GENERAL ASSESSMENT: alert, oriented to person, place and time, no acute distress and no anxiety, depression or agitation. Very hard of hearing. Chest: normal work of breathing. CVS exam: normal rate, regular rhythm, normal S1, S2, no murmurs, rubs, clicks or gallops. ABDOMEN: not done  Neurological exam reveals alert, oriented, normal speech, no focal findings or movement disorder noted. FEMALE GENITOURINARY EXAM: not done  MALE GENITAL EXAM: not done    Assessment:     CT abdomen pelvis with contrast -   A 5-6 mm stone upper right ureter with mild hydronephrosis and   small perinephric urinoma. UA neg for nitrites. WBC 7.3, cr. 1.00. VSS. No fevers. Plan: Will schedule Right cystoscopy/ stent right lithotripsy today with Dr. Yves Mariano. NPO.     Signed By: ANKITA Duque     February 10, 2023

## 2023-02-10 NOTE — ANESTHESIA PRE PROCEDURE
Department of Anesthesiology  Preprocedure Note       Name:  Maeve Iqbal   Age:  81 y.o.  :  1935                                          MRN:  [id number]         Date:  2/10/2023      Surgeon: Jamison Rashid):  Mary Lou Ford MD    Procedure: Procedure(s):  CYSTOSCOPY, RIGHT URETEROSCOPY, LASER, LITHO AND STENT    Medications prior to admission:   Prior to Admission medications    Medication Sig Start Date End Date Taking? Authorizing Provider   atorvastatin (LIPITOR) 40 MG tablet Take 40 mg by mouth daily 22  Historical Provider, MD   Cholecalciferol 50 MCG (2000 UT) TABS Strength: 2000 intl units;  Form: tablet; SIG: take 1 tab(s) orally twice a day for 30 day(s) 4/27/15   Historical Provider, MD   cyanocobalamin 1000 MCG tablet Take 1,000 mcg by mouth daily 20   Historical Provider, MD   Hyoscyamine Sulfate SL 0.125 MG SUBL Place 0.125 mg under the tongue 4 times daily as needed 20   Historical Provider, MD   levothyroxine (SYNTHROID) 88 MCG tablet Take 88 mcg by mouth daily 22   Historical Provider, MD   lisinopril (PRINIVIL;ZESTRIL) 2.5 MG tablet Take 2.5 mg by mouth daily 22   Historical Provider, MD   melatonin 3 MG TABS tablet Take 3 mg by mouth 20   Historical Provider, MD   metFORMIN (GLUCOPHAGE) 500 MG tablet Take by mouth 9/8/22 3/9/23  Historical Provider, MD   niacin 500 MG tablet Strength: 500 mg; Form: ; SIG: take 1 tab  2 times a day 4/27/15   Historical Provider, MD       Current medications:    Current Facility-Administered Medications   Medication Dose Route Frequency Provider Last Rate Last Admin    sodium chloride flush 0.9 % injection 5-40 mL  5-40 mL IntraVENous 2 times per day Thanh Flores DO   10 mL at 02/10/23 0927    sodium chloride flush 0.9 % injection 5-40 mL  5-40 mL IntraVENous PRN Thanh Flores DO        0.9 % sodium chloride infusion   IntraVENous PRN Thanh Flores DO        ondansetron (ZOFRAN-ODT) disintegrating tablet 4 mg  4 mg Oral Q8H PRN Verdene Cane, DO        Or    ondansetron [facility]) injection 4 mg  4 mg IntraVENous Q6H PRN Verdene Cane, DO        polyethylene glycol (GLYCOLAX) packet 17 g  17 g Oral Daily PRN Verdene Cane, DO        acetaminophen (TYLENOL) tablet 650 mg  650 mg Oral Q6H PRN Verdene Cane, DO        Or    acetaminophen (TYLENOL) suppository 650 mg  650 mg Rectal Q6H PRN Verdene Cane, DO        atorvastatin (LIPITOR) tablet 40 mg  40 mg Oral Nightly Verdene Cane, DO   40 mg at 02/09/23 2201    levothyroxine (SYNTHROID) tablet 88 mcg  88 mcg Oral Daily Verdene Cane, DO   88 mcg at 02/10/23 1302    melatonin tablet 3 mg  3 mg Oral Nightly PRN Verdene Cane, DO        insulin lispro (HUMALOG) injection vial 0-8 Units  0-8 Units SubCUTAneous TID WC Verdene Cane, DO        insulin lispro (HUMALOG) injection vial 0-4 Units  0-4 Units SubCUTAneous Nightly Verdene Cane, DO        glucose chewable tablet 16 g  4 tablet Oral PRN Verdene Cane, DO        dextrose bolus 10% 125 mL  125 mL IntraVENous PRN Verdene Cane, DO        Or    dextrose bolus 10% 250 mL  250 mL IntraVENous PRN Verdene Cane, DO        glucagon (rDNA) injection 1 mg  1 mg SubCUTAneous PRN Verdene Cane, DO        dextrose 10 % infusion   IntraVENous Continuous PRN Verdene Cane, DO        hydrALAZINE (APRESOLINE) injection 10 mg  10 mg IntraVENous Q6H PRN Verdene Cane, DO        cefTRIAXone (ROCEPHIN) 1,000 mg in sodium chloride 0.9 % 50 mL IVPB (mini-bag)  1,000 mg IntraVENous Q24H Verdene Cane, DO   Stopped at 02/09/23 1903    oxyCODONE (ROXICODONE) immediate release tablet 2.5 mg  2.5 mg Oral Q4H PRN Verdene Cane, DO        morphine injection 0.5 mg  0.5 mg IntraVENous Q4H PRN Verdene Cane, DO        tuberculin injection 5 Units  5 Units IntraDERmal Once Verdene Cane, DO           Allergies:  No Known Allergies    Problem List:    Patient Active Problem List   Diagnosis Code    Urinary tract obstruction by kidney stone N20.0, N13.8    Diabetes mellitus, type II ([facility] [address] E11.9    HLD (hyperlipidemia) E78.5    Hypothyroidism E03.9       Past Medical History:  History reviewed. No pertinent past medical history. Past Surgical History:  No past surgical history on file. Social History:    Social History     Tobacco Use    Smoking status: Not on file    Smokeless tobacco: Not on file   Substance Use Topics    Alcohol use: Not on file                                Counseling given: Not Answered      Vital Signs (Current):   Vitals:    02/10/23 0329 02/10/23 0705 02/10/23 1118 02/10/23 1531   BP: (!) 119/48 (!) 114/57 (!) 99/55 121/65   Pulse: 83 99 88 88   Resp: 18 22 20 20   Temp: 99.9 °F (37.7 °C) 99 °F (37.2 °C) 98.2 °F (36.8 °C) 98.2 °F (36.8 °C)   TempSrc: Oral Oral Oral Oral   SpO2: 93% 94% 96% 97%   Weight:       Height:                                                  BP Readings from Last 3 Encounters:   02/10/23 121/65   02/09/23 139/73       NPO Status:                                                                                 BMI:   Wt Readings from Last 3 Encounters:   02/09/23 140 lb (63.5 kg)   02/09/23 140 lb (63.5 kg)     Body mass index is 21.93 kg/m².     CBC:   Lab Results   Component Value Date/Time    WBC 7.3 02/10/2023 06:16 AM    RBC 3.93 02/10/2023 06:16 AM    HGB 10.6 02/10/2023 06:16 AM    HCT 35.4 02/10/2023 06:16 AM    MCV 90.1 02/10/2023 06:16 AM    RDW 13.8 02/10/2023 06:16 AM     02/10/2023 06:16 AM       CMP:   Lab Results   Component Value Date/Time     02/10/2023 06:16 AM    K 3.8 02/10/2023 06:16 AM     02/10/2023 06:16 AM    CO2 27 02/10/2023 06:16 AM    BUN 19 02/10/2023 06:16 AM    CREATININE 1.00 02/10/2023 06:16 AM    LABGLOM 55 02/10/2023 06:16 AM    GLUCOSE 150 02/10/2023 06:16 AM    PROT 6.5 02/09/2023 10:35 AM    CALCIUM 8.8 02/10/2023 06:16 AM    BILITOT 0.6 02/09/2023 10:35 AM ALKPHOS 79 02/09/2023 10:35 AM    AST 12 02/09/2023 10:35 AM    ALT 8 02/09/2023 10:35 AM       POC Tests:   Recent Labs     02/10/23  1532   POCGLU 131*       Coags: No results found for: PROTIME, INR, APTT    HCG (If Applicable): No results found for: PREGTESTUR, PREGSERUM, HCG, HCGQUANT     ABGs: No results found for: PHART, PO2ART, MXJ8VQA, HXV4OOR, BEART, Y8URZCBM     Type & Screen (If Applicable):  No results found for: LABABO, LABRH    Drug/Infectious Status (If Applicable):  No results found for: HIV, HEPCAB    COVID-19 Screening (If Applicable): No results found for: COVID19        Anesthesia Evaluation  Patient summary reviewed and Nursing notes reviewed  Airway: Mallampati: II  TM distance: >3 FB   Neck ROM: full     Dental:          Pulmonary:Negative Pulmonary ROS and normal exam                               Cardiovascular:  Exercise tolerance: good (>4 METS),   (+) hypertension:,         Rhythm: regular  Rate: normal                    Neuro/Psych:   Negative Neuro/Psych ROS              GI/Hepatic/Renal: Neg GI/Hepatic/Renal ROS            Endo/Other: Negative Endo/Other ROS   (+) DiabetesType II DM, , hypothyroidism::., .          Pt had no PAT visit       Abdominal:             Vascular: negative vascular ROS. Other Findings:           Anesthesia Plan      general     ASA 2       Induction: intravenous. MIPS: Postoperative opioids intended and Prophylactic antiemetics administered. Anesthetic plan and risks discussed with patient.       Plan discussed with surgical team.                    Tamar Thomas MD   2/10/2023

## 2023-02-10 NOTE — CARE COORDINATION
CM met with MsTj Yanelis Chris in room 630 regarding discharge planning. Patient alert and oriented x4. Patient [place]. Demographics verified. Patient resides alone in a one story home. Prior to admission, patient reports she is independent with completing ADL's and drives. Patient uses a RW when needed. Patient insured with a PCP for follow up care. Discharge planning: PT/OT to be consulted, following pt's procedure with urology. Discharge plan, TBD pending clinical course. No discharge needs identified at this time. CM will continue to follow and assist as needed. 02/10/23 1229   Service Assessment   Patient Orientation Alert and Oriented;Place;Person;Situation   Cognition Alert   History Provided By Patient;Medical Record   Primary Caregiver Self   Support Systems Family Members;Friends/Neighbors   Patient's Healthcare Decision Maker is: Legal Next of Kin  (Pt's ernestine (4 Living- per patient))   PCP Verified by CM Yes   Last Visit to PCP Within last 6 months   Prior Functional Level Independent in ADLs/IADLs   Current Functional Level Other (see comment)  (pending clinical course.)   Can patient return to prior living arrangement Unknown at present   Ability to make needs known: Good   Family able to assist with home care needs: Yes  (Pt's niece Ale Moreno is supportive with pt's care needs.)   Financial Resources Medicare   Social/Functional History   Lives With Alone   Type of [address] One level   Home Access Stairs to enter with rails   Home Equipment Walker, [address] Help From Family   Active  Yes   Mode of Transportation Car   Occupation Retired   Discharge Planning   Current Services Prior To Admission None   Potential [address] Medications No  (Patient is able to afford prescription medications.)   Patient expects to be discharged to:  Unknown   Services At/After Discharge   Transition of Care Consult (CM Consult) Discharge Planning   [employer] Information Provided? No   Mode of Transport at Discharge Other (see comment)  (TBD, based upon need.)   Confirm Follow Up Transport Self   Condition of Participation: Discharge Planning   The Plan for Transition of Care is related to the following treatment goals: pending clinical course.

## 2023-02-11 LAB
ANION GAP SERPL CALC-SCNC: 5 MMOL/L (ref 2–11)
BASOPHILS # BLD: 0 K/UL (ref 0–0.2)
BASOPHILS NFR BLD: 0 % (ref 0–2)
BUN SERPL-MCNC: 21 MG/DL (ref 8–23)
CALCIUM SERPL-MCNC: 8.5 MG/DL (ref 8.3–10.4)
CHLORIDE SERPL-SCNC: 105 MMOL/L (ref 101–110)
CO2 SERPL-SCNC: 30 MMOL/L (ref 21–32)
CREAT SERPL-MCNC: 0.9 MG/DL (ref 0.6–1)
DIFFERENTIAL METHOD BLD: ABNORMAL
EOSINOPHIL # BLD: 0 K/UL (ref 0–0.8)
EOSINOPHIL NFR BLD: 0 % (ref 0.5–7.8)
ERYTHROCYTE [DISTWIDTH] IN BLOOD BY AUTOMATED COUNT: 14.3 % (ref 11.9–14.6)
GLUCOSE BLD STRIP.AUTO-MCNC: 160 MG/DL (ref 65–100)
GLUCOSE BLD STRIP.AUTO-MCNC: 174 MG/DL (ref 65–100)
GLUCOSE BLD STRIP.AUTO-MCNC: 174 MG/DL (ref 65–100)
GLUCOSE BLD STRIP.AUTO-MCNC: 199 MG/DL (ref 65–100)
GLUCOSE SERPL-MCNC: 174 MG/DL (ref 65–100)
HCT VFR BLD AUTO: 33.3 % (ref 35.8–46.3)
HGB BLD-MCNC: 10.2 G/DL (ref 11.7–15.4)
IMM GRANULOCYTES # BLD AUTO: 0.1 K/UL (ref 0–0.5)
IMM GRANULOCYTES NFR BLD AUTO: 1 % (ref 0–5)
LYMPHOCYTES # BLD: 0.8 K/UL (ref 0.5–4.6)
LYMPHOCYTES NFR BLD: 7 % (ref 13–44)
MCH RBC QN AUTO: 27.5 PG (ref 26.1–32.9)
MCHC RBC AUTO-ENTMCNC: 30.6 G/DL (ref 31.4–35)
MCV RBC AUTO: 89.8 FL (ref 82–102)
MONOCYTES # BLD: 1 K/UL (ref 0.1–1.3)
MONOCYTES NFR BLD: 8 % (ref 4–12)
NEUTS SEG # BLD: 10.8 K/UL (ref 1.7–8.2)
NEUTS SEG NFR BLD: 84 % (ref 43–78)
NRBC # BLD: 0 K/UL (ref 0–0.2)
PLATELET # BLD AUTO: 228 K/UL (ref 150–450)
PMV BLD AUTO: 11.1 FL (ref 9.4–12.3)
POTASSIUM SERPL-SCNC: 4.3 MMOL/L (ref 3.5–5.1)
RBC # BLD AUTO: 3.71 M/UL (ref 4.05–5.2)
SERVICE CMNT-IMP: ABNORMAL
SODIUM SERPL-SCNC: 140 MMOL/L (ref 133–143)
WBC # BLD AUTO: 12.7 K/UL (ref 4.3–11.1)

## 2023-02-11 PROCEDURE — 1100000000 HC RM PRIVATE

## 2023-02-11 PROCEDURE — 97530 THERAPEUTIC ACTIVITIES: CPT

## 2023-02-11 PROCEDURE — 6360000002 HC RX W HCPCS: Performed by: FAMILY MEDICINE

## 2023-02-11 PROCEDURE — 99231 SBSQ HOSP IP/OBS SF/LOW 25: CPT | Performed by: UROLOGY

## 2023-02-11 PROCEDURE — 80048 BASIC METABOLIC PNL TOTAL CA: CPT

## 2023-02-11 PROCEDURE — 6370000000 HC RX 637 (ALT 250 FOR IP): Performed by: FAMILY MEDICINE

## 2023-02-11 PROCEDURE — 2580000003 HC RX 258: Performed by: FAMILY MEDICINE

## 2023-02-11 PROCEDURE — 82962 GLUCOSE BLOOD TEST: CPT

## 2023-02-11 PROCEDURE — 97165 OT EVAL LOW COMPLEX 30 MIN: CPT

## 2023-02-11 PROCEDURE — 85025 COMPLETE CBC W/AUTO DIFF WBC: CPT

## 2023-02-11 PROCEDURE — 36415 COLL VENOUS BLD VENIPUNCTURE: CPT

## 2023-02-11 RX ADMIN — CEFTRIAXONE 1000 MG: 1 INJECTION, POWDER, FOR SOLUTION INTRAMUSCULAR; INTRAVENOUS at 17:45

## 2023-02-11 RX ADMIN — ATORVASTATIN CALCIUM 40 MG: 40 TABLET, FILM COATED ORAL at 21:24

## 2023-02-11 RX ADMIN — SODIUM CHLORIDE, PRESERVATIVE FREE 10 ML: 5 INJECTION INTRAVENOUS at 08:49

## 2023-02-11 RX ADMIN — LEVOTHYROXINE SODIUM 88 MCG: 0.09 TABLET ORAL at 08:48

## 2023-02-11 RX ADMIN — SODIUM CHLORIDE, PRESERVATIVE FREE 10 ML: 5 INJECTION INTRAVENOUS at 21:28

## 2023-02-11 RX ADMIN — ACETAMINOPHEN 650 MG: 325 TABLET ORAL at 21:24

## 2023-02-11 ASSESSMENT — PAIN SCALES - GENERAL
PAINLEVEL_OUTOF10: 0
PAINLEVEL_OUTOF10: 3

## 2023-02-11 ASSESSMENT — PAIN DESCRIPTION - ORIENTATION: ORIENTATION: RIGHT

## 2023-02-11 ASSESSMENT — PAIN - FUNCTIONAL ASSESSMENT: PAIN_FUNCTIONAL_ASSESSMENT: ACTIVITIES ARE NOT PREVENTED

## 2023-02-11 ASSESSMENT — PAIN DESCRIPTION - DESCRIPTORS: DESCRIPTORS: ACHING

## 2023-02-11 ASSESSMENT — PAIN DESCRIPTION - LOCATION: LOCATION: ABDOMEN

## 2023-02-11 NOTE — ANESTHESIA POSTPROCEDURE EVALUATION
Department of Anesthesiology  Postprocedure Note    Patient: Sid Bray  MRN: [id number]  YOB: 1935  Date of evaluation: 2/11/2023      Procedure Summary     Date: 02/10/23 Room / Location: [facility] MAIN OR 01 CYSTO / [facility] MAIN OR    Anesthesia Start: 6937 Anesthesia Stop: 2728    Procedure: CYSTOSCOPY, RIGHT URETEROSCOPY, LASER, LITHO AND STENT / RIGHT RETROGRADE (Right: Ureter) Diagnosis:       Right ureteral stone      (Right ureteral stone [N20.1])    Providers: Juwan Cantu MD Responsible Provider: Yo Pleitez MD    Anesthesia Type: general ASA Status: 2          Anesthesia Type: No value filed.     Dakota Phase I: Dakota Score: 8    Dakota Phase II:        Anesthesia Post Evaluation    Patient location during evaluation: PACU  Patient participation: complete - patient participated  Level of consciousness: awake and awake and alert  Airway patency: patent  Nausea & Vomiting: no nausea  Complications: no  Cardiovascular status: hemodynamically stable  Respiratory status: acceptable  Hydration status: euvolemic  Multimodal analgesia pain management approach

## 2023-02-11 NOTE — DISCHARGE INSTRUCTIONS
-Keep R ureter stent in place for 1 week. Will arrange outpatient cystoscopy, right ureter stent pull in our office. Office will call to schedule  -Rest of care per primary team  -Will sign off. Call with questions.

## 2023-02-11 NOTE — PROGRESS NOTES
Urology Progress Note    Admit Date: 2/9/2023    Subjective:     Patient has no new complaints. Tolerating stent well. Afebrile.      Objective:     Patient Vitals for the past 8 hrs:   BP Temp Temp src Pulse Resp SpO2   02/11/23 0722 (!) 119/58 98.1 °F (36.7 °C) Oral 70 18 93 %   02/11/23 0350 131/71 98.2 °F (36.8 °C) Oral 95 16 95 %     02/11 0701 - 02/11 1900  In: 240 [P.O.:240]  Out: -   02/09 1901 - 02/11 0700  In: 700 [I.V.:700]  Out: -     Physical Exam:   BP (!) 119/58   Pulse 70   Temp 98.1 °F (36.7 °C) (Oral)   Resp 18   Ht 5' 7\" (1.702 m)   Wt 140 lb (63.5 kg)   SpO2 93%   BMI 21.93 kg/m²      GENERAL: No acute distress, Awake, Alert, Oriented X 3, Gait normal  CARDIAC: regular rate and rhythm  CHEST AND LUNG: Easy work of breathing, clear to auscultation bilaterally, no cyanosis  ABDOMEN: soft, non tender, non-distended, positive bowel sounds, no organomegaly, no palpable masses, no guarding, no rebound tenderness  SKIN: No rash, no erythema, no lacerations or abrasions, no ecchymosis  NEUROLOGIC: cranial nerves 2-12 grossly intact           Data Review   Recent Results (from the past 24 hour(s))   POCT Glucose    Collection Time: 02/10/23 11:19 AM   Result Value Ref Range    POC Glucose 172 (H) 65 - 100 mg/dL    Performed by: Begum (Holly)    POCT Glucose    Collection Time: 02/10/23  3:32 PM   Result Value Ref Range    POC Glucose 131 (H) 65 - 100 mg/dL    Performed by: Begum (Holly)    POCT Glucose    Collection Time: 02/10/23  6:25 PM   Result Value Ref Range    POC Glucose 119 (H) 65 - 100 mg/dL    Performed by: Robin    POCT Glucose    Collection Time: 02/10/23  8:11 PM   Result Value Ref Range    POC Glucose 129 (H) 65 - 100 mg/dL    Performed by: Nuha    Basic Metabolic Panel w/ Reflex to MG    Collection Time: 02/11/23  6:56 AM   Result Value Ref Range    Sodium 140 133 - 143 mmol/L    Potassium 4.3 3.5 - 5.1 mmol/L    Chloride 105 101 - 110 mmol/L CO2 30 21 - 32 mmol/L    Anion Gap 5 2 - 11 mmol/L    Glucose 174 (H) 65 - 100 mg/dL    BUN 21 8 - 23 MG/DL    Creatinine 0.90 0.6 - 1.0 MG/DL    Est, Glom Filt Rate >60 >60 ml/min/1.73m2    Calcium 8.5 8.3 - 10.4 MG/DL   CBC with Auto Differential    Collection Time: 02/11/23  6:56 AM   Result Value Ref Range    WBC 12.7 (H) 4.3 - 11.1 K/uL    RBC 3.71 (L) 4.05 - 5.2 M/uL    Hemoglobin 10.2 (L) 11.7 - 15.4 g/dL    Hematocrit 33.3 (L) 35.8 - 46.3 %    MCV 89.8 82 - 102 FL    MCH 27.5 26.1 - 32.9 PG    MCHC 30.6 (L) 31.4 - 35.0 g/dL    RDW 14.3 11.9 - 14.6 %    Platelets 802 429 - 548 K/uL    MPV 11.1 9.4 - 12.3 FL    nRBC 0.00 0.0 - 0.2 K/uL    Differential Type AUTOMATED      Seg Neutrophils 84 (H) 43 - 78 %    Lymphocytes 7 (L) 13 - 44 %    Monocytes 8 4.0 - 12.0 %    Eosinophils % 0 (L) 0.5 - 7.8 %    Basophils 0 0.0 - 2.0 %    Immature Granulocytes 1 0.0 - 5.0 %    Segs Absolute 10.8 (H) 1.7 - 8.2 K/UL    Absolute Lymph # 0.8 0.5 - 4.6 K/UL    Absolute Mono # 1.0 0.1 - 1.3 K/UL    Absolute Eos # 0.0 0.0 - 0.8 K/UL    Basophils Absolute 0.0 0.0 - 0.2 K/UL    Absolute Immature Granulocyte 0.1 0.0 - 0.5 K/UL   POCT Glucose    Collection Time: 02/11/23  8:01 AM   Result Value Ref Range    POC Glucose 174 (H) 65 - 100 mg/dL    Performed by: Feliz Martin            Assessment:     Principal Problem:    Urinary tract obstruction by kidney stone  Active Problems:    Diabetes mellitus, type II (HCC)    HLD (hyperlipidemia)    Hypothyroidism  Resolved Problems:    * No resolved hospital problems. *    POD 1 s/p Cystoscopy, RIGHT Ureteroscopy, Laser Lithotripsy, RIGHT Ureteral Stent Placement    Plan:     -Keep R ureter stent in place for 1 week. Will arrange outpatient cystoscopy, right ureter stent pull in our office. Office will call to schedule  -Rest of care per primary team  -Will sign off. Call with questions. Arnaud Breen M.D.     [facility]  [address]  [address] [facility], [address], [address]  Phone: [phone]  Fax: [phone]

## 2023-02-11 NOTE — OP NOTE
[address] [place] [address]  OPERATIVE REPORT    Name:  Kalina Rodriguez  MR#:  [id number]  :  1935  ACCOUNT #:  [de-identified]  DATE OF SERVICE:  02/10/2023    PREOPERATIVE DIAGNOSIS:  Right ureteral stone. POSTOPERATIVE DIAGNOSIS:  Right ureteral stone. PROCEDURE PERFORMED:  Cystoscopy, right ureteroscopy, laser lithotripsy, basket stone extraction, stent placement, right retrograde pyelogram, intraoperative interpretation of fluoroscopy less than one hour. SURGEON:  Hanny Ardon MD    ASSISTANT:  None. ANESTHESIA:  General.    COMPLICATIONS:  None. SPECIMENS REMOVED:  Right kidney stones for analysis. IMPLANTS:  A 6 x 26 double-J right ureteral stent without strings. ESTIMATED BLOOD LOSS:  None. DRAINS:  None. FINDINGS:  A 3-mm impacted right UPJ stone. RIGHT RETROGRADE PYELOGRAM INTERPRETATION:  No extravasation of contrast, suggests a forniceal rupture. Right hydronephrosis behind the proximal right ureteral stone. INDICATIONS FOR OPERATIVE PROCEDURE:  The patient is a pleasant 81-year-old female who was admitted to the hospital for right flank pain. She had a CT scan showing a possible forniceal rupture/urinoma from a 3-mm obstructing right UPJ stone. She was counseled on management options and wanted to proceed with cystoscopy, right retrograde pyelogram, possible stent, possible ureteroscopy. After risk-benefit discussion was had, she opted to proceed. DESCRIPTION OF OPERATIVE PROCEDURE:  After informed consent was obtained and the correct patient was identified in preoperative holding area, she was taken back to the operating suite, placed on table in supine position. Time-out was performed confirming correct patient and planned procedure. She received 2 g IV Ancef prior to smooth induction of general endotracheal anesthesia. She was moved into dorsal lithotomy position, prepped and draped in usual sterile fashion.   I began the case by inserting a 22-[place] rigid cystoscope with a 30-degree lens in the urethra and advanced into the patient's bladder. Pancystoscopy was performed which was unremarkable. The right ureteral orifice was seen in the orthotopic position. No bladder abnormalities were noted. I cannulated the right ureteral orifice with a sensor wire and advanced under fluoroscopic guidance into the right renal pelvis. I then inserted a 5-[place] open-ended ureteral catheter and performed a retrograde pyelogram which showed no evidence of a forniceal rupture, no urine leak, and no extravasation of contrast.  It did show hydroureteronephrosis behind and obstructing proximal right ureteral stone however. Given the normal retrograde without any extravasation of contrast or concern for a leak, I decided it would be safe to proceed with ureteroscopy. I therefore inserted my semi-rigid ureteroscope and advanced it under direct vision. Unfortunately, it would only pass the mid ureter and could not reach the proximal stone. I passed a second sensor wire through the scope, removed it, and loaded 11 x 13 access sheath over the second sensor wire and advanced it into the proximal right ureter. I removed the second wire and inner sheath leaving the outer sheath in place with the safety wire. I then switched to my flexible ureteroscope and inserted this through the sheath into the proximal right ureter. It was here that I encountered the right ureteral stone measuring 3 mm. I then used a 242 laser fiber with the settings of 0.8 joules and 8 Hz to laser the stone into basketable pieces. A 2d2c basket was used to grab the pieces and remove them completely intact and sent them off for stone analysis. I then performed pyeloscopy of each and every calyx one final time. No other stone fragments were identified and then I carefully backed my scope down the patient's ureter.   She had some significant erythema and inflammation at the site of the proximal ureteral stone where it was stuck, but there was no evidence of any ureteral injury or other abnormality noted. The sheath and scope were removed from the ureter. I then loaded the wire onto the rigid cystoscope and passed a 6 x 26 double-J right ureter stent without strings over the wire under fluoroscopic guidance in the right renal pelvis. Once the stent was in position, I pulled the wire. I noted good curl in the right renal pelvis as well as the bladder. I drained the bladder completely. The patient was awoken from anesthesia and transferred to PACU in stable condition. She tolerated the procedure well. There were no complications. All counts were correct at the end procedure.       Elsa Malloy MD      [id number]  D:  02/10/2023 19:15  T:  02/11/2023 1:57  JOB #:  [id number]

## 2023-02-11 NOTE — PROGRESS NOTES
Hospitalist Progress Note   Admit Date:  2023  5:25 PM   Name:  Alvarado Enamorado   Age:  81 y.o. Sex:  female  :  1935   MRN:  [id number]   Room:  [facility]    Presenting Complaint: No chief complaint on file. Reason(s) for Admission: Urinary tract obstruction by kidney stone [N20.0, N13.8]     Hospital Course:   Patient is a 81 y.o. female who presented to [facility] ER for cc right flank pain. I cannot obtain hx well due to patient being severely hearing impaired. She is even having difficulty understanding her brother in law. Hx of HLD, hypothyroidism, HTN, DM type II, and hearing impairment.      CT abdomen pelvis with contrast -   A 5-6 mm stone upper right ureter with mild hydronephrosis and   small perinephric urinoma. Subjective & 24hr Events (23): Patient without acute event overnight. T      Assessment & Plan:     Principal Problem:    Urinary tract obstruction by kidney stone  Plan: s/p lithotripsy and rt ureteral stent 2/10. Stent will be in for 1 wk and removed as an outpt. Will continue her on Rocephin for a 5 day course empirically. No urine ctx obtained  Active Problems:    Diabetes mellitus, type II (HCC)  Plan: fsbs wnl, prn csi    HLD (hyperlipidemia)  Plan: statin    Hypothyroidism  Plan: synthroid      Anticipated discharge needs:      Possible discharge in 24-48 hrs if medically stable    Diet:  ADULT DIET; Regular  DVT PPx: scds  Code status: Full Code    Hospital Problems:  Principal Problem:    Urinary tract obstruction by kidney stone  Active Problems:    Diabetes mellitus, type II ([address]    HLD (hyperlipidemia)    Hypothyroidism  Resolved Problems:    * No resolved hospital problems.  *      Objective:   Patient Vitals for the past 24 hrs:   Temp Pulse Resp BP SpO2   23 1047 98.1 °F (36.7 °C) 76 18 136/75 96 %   23 0722 98.1 °F (36.7 °C) 70 18 (!) 119/58 93 %   23 0350 98.2 °F (36.8 °C) 95 16 131/71 95 %   02/10/23 2237 98.4 °F (36.9 °C) 83 17 (!) 128/56 93 %   02/10/23 1934 98.1 °F (36.7 °C) 74 16 (!) 140/58 94 %   02/10/23 1909 -- 80 17 123/60 100 %   02/10/23 1904 -- 74 16 125/60 100 %   02/10/23 1859 -- 77 17 (!) 121/59 100 %   02/10/23 1854 -- 73 16 (!) 134/56 100 %   02/10/23 1849 -- 72 17 (!) 122/58 100 %   02/10/23 1844 -- 72 16 131/61 100 %   02/10/23 1841 -- 83 16 (!) 145/60 100 %   02/10/23 1837 -- 75 16 (!) 120/56 99 %   02/10/23 1832 -- 95 15 (!) 113/57 99 %   02/10/23 1824 -- 84 16 (!) 116/55 100 %   02/10/23 1819 -- 86 15 (!) 127/58 100 %   02/10/23 1816 -- 83 16 (!) 115/57 100 %   02/10/23 1812 98.9 °F (37.2 °C) 93 15 (!) 140/64 100 %   02/10/23 1606 97.1 °F (36.2 °C) 85 16 (!) 141/60 97 %   02/10/23 1531 98.2 °F (36.8 °C) 88 20 121/65 97 %       Oxygen Therapy  SpO2: 96 %  Pulse via Oximetry: 83 beats per minute  Pulse Oximeter Device Mode: Continuous  Pulse Oximeter Device Location: Left, Finger  O2 Device: None (Room air)  O2 Flow Rate (L/min): 3 L/min    Estimated body mass index is 21.93 kg/m² as calculated from the following:    Height as of this encounter: 5' 7\" (1.702 m). Weight as of this encounter: 140 lb (63.5 kg). Intake/Output Summary (Last 24 hours) at 2/11/2023 1336  Last data filed at 2/11/2023 0944  Gross per 24 hour   Intake 940 ml   Output --   Net 940 ml         Physical Exam:     Blood pressure 136/75, pulse 76, temperature 98.1 °F (36.7 °C), temperature source Oral, resp. rate 18, height 5' 7\" (1.702 m), weight 140 lb (63.5 kg), SpO2 96 %. General:    Well nourished. Head:  Normocephalic, atraumatic  Eyes:  Sclerae appear normal.  Pupils equally round. ENT:  Nares appear normal.  Moist oral mucosa. [place]  Neck:  No restricted ROM. Trachea midline   CV:   RRR. No m/r/g. No jugular venous distension. Lungs:   CTAB. No wheezing, rhonchi, or rales. Symmetric expansion. Abdomen:   Soft, nontender, nondistended. Mild rt CVA tenderness  Extremities: No cyanosis or clubbing.   No edema  Skin:     No rashes and normal coloration. Warm and dry. Neuro:  CN II-XII grossly intact. Psych:  Normal mood and affect.       I have personally reviewed labs and tests:  Recent Labs:  Recent Results (from the past 48 hour(s))   POCT Glucose    Collection Time: 02/09/23 10:00 PM   Result Value Ref Range    POC Glucose 241 (H) 65 - 100 mg/dL    Performed by: Natalie Calhoun    Basic Metabolic Panel w/ Reflex to MG    Collection Time: 02/10/23  6:16 AM   Result Value Ref Range    Sodium 141 133 - 143 mmol/L    Potassium 3.8 3.5 - 5.1 mmol/L    Chloride 103 101 - 110 mmol/L    CO2 27 21 - 32 mmol/L    Anion Gap 11 2 - 11 mmol/L    Glucose 150 (H) 65 - 100 mg/dL    BUN 19 8 - 23 MG/DL    Creatinine 1.00 0.6 - 1.0 MG/DL    Est, Glom Filt Rate 55 (L) >60 ml/min/1.73m2    Calcium 8.8 8.3 - 10.4 MG/DL   CBC with Auto Differential    Collection Time: 02/10/23  6:16 AM   Result Value Ref Range    WBC 7.3 4.3 - 11.1 K/uL    RBC 3.93 (L) 4.05 - 5.2 M/uL    Hemoglobin 10.6 (L) 11.7 - 15.4 g/dL    Hematocrit 35.4 (L) 35.8 - 46.3 %    MCV 90.1 82 - 102 FL    MCH 27.0 26.1 - 32.9 PG    MCHC 29.9 (L) 31.4 - 35.0 g/dL    RDW 13.8 11.9 - 14.6 %    Platelets 568 774 - 973 K/uL    MPV 10.0 9.4 - 12.3 FL    nRBC 0.00 0.0 - 0.2 K/uL    Differential Type AUTOMATED      Seg Neutrophils 94 (H) 43 - 78 %    Lymphocytes 2 (L) 13 - 44 %    Monocytes 1 (L) 4.0 - 12.0 %    Eosinophils % 2 0.5 - 7.8 %    Basophils 1 0.0 - 2.0 %    Immature Granulocytes 1 0.0 - 5.0 %    Segs Absolute 6.8 1.7 - 8.2 K/UL    Absolute Lymph # 0.1 (L) 0.5 - 4.6 K/UL    Absolute Mono # 0.1 0.1 - 1.3 K/UL    Absolute Eos # 0.2 0.0 - 0.8 K/UL    Basophils Absolute 0.0 0.0 - 0.2 K/UL    Absolute Immature Granulocyte 0.0 0.0 - 0.5 K/UL   POCT Glucose    Collection Time: 02/10/23  7:07 AM   Result Value Ref Range    POC Glucose 141 (H) 65 - 100 mg/dL    Performed by: Begum (Holly)    POCT Glucose    Collection Time: 02/10/23 11:19 AM   Result Value Ref Range    POC Glucose 172 (H) 65 - 100 mg/dL    Performed by: Sarah)CALVIN    POCT Glucose    Collection Time: 02/10/23  3:32 PM   Result Value Ref Range    POC Glucose 131 (H) 65 - 100 mg/dL    Performed by: Sarah)CALVIN    POCT Glucose    Collection Time: 02/10/23  6:25 PM   Result Value Ref Range    POC Glucose 119 (H) 65 - 100 mg/dL    Performed by: Robin    POCT Glucose    Collection Time: 02/10/23  8:11 PM   Result Value Ref Range    POC Glucose 129 (H) 65 - 100 mg/dL    Performed by: Tristian Mackenzie    Basic Metabolic Panel w/ Reflex to MG    Collection Time: 02/11/23  6:56 AM   Result Value Ref Range    Sodium 140 133 - 143 mmol/L    Potassium 4.3 3.5 - 5.1 mmol/L    Chloride 105 101 - 110 mmol/L    CO2 30 21 - 32 mmol/L    Anion Gap 5 2 - 11 mmol/L    Glucose 174 (H) 65 - 100 mg/dL    BUN 21 8 - 23 MG/DL    Creatinine 0.90 0.6 - 1.0 MG/DL    Est, Glom Filt Rate >60 >60 ml/min/1.73m2    Calcium 8.5 8.3 - 10.4 MG/DL   CBC with Auto Differential    Collection Time: 02/11/23  6:56 AM   Result Value Ref Range    WBC 12.7 (H) 4.3 - 11.1 K/uL    RBC 3.71 (L) 4.05 - 5.2 M/uL    Hemoglobin 10.2 (L) 11.7 - 15.4 g/dL    Hematocrit 33.3 (L) 35.8 - 46.3 %    MCV 89.8 82 - 102 FL    MCH 27.5 26.1 - 32.9 PG    MCHC 30.6 (L) 31.4 - 35.0 g/dL    RDW 14.3 11.9 - 14.6 %    Platelets 588 089 - 485 K/uL    MPV 11.1 9.4 - 12.3 FL    nRBC 0.00 0.0 - 0.2 K/uL    Differential Type AUTOMATED      Seg Neutrophils 84 (H) 43 - 78 %    Lymphocytes 7 (L) 13 - 44 %    Monocytes 8 4.0 - 12.0 %    Eosinophils % 0 (L) 0.5 - 7.8 %    Basophils 0 0.0 - 2.0 %    Immature Granulocytes 1 0.0 - 5.0 %    Segs Absolute 10.8 (H) 1.7 - 8.2 K/UL    Absolute Lymph # 0.8 0.5 - 4.6 K/UL    Absolute Mono # 1.0 0.1 - 1.3 K/UL    Absolute Eos # 0.0 0.0 - 0.8 K/UL    Basophils Absolute 0.0 0.0 - 0.2 K/UL    Absolute Immature Granulocyte 0.1 0.0 - 0.5 K/UL   POCT Glucose    Collection Time: 02/11/23  8:01 AM   Result Value Ref Range POC Glucose 174 (H) 65 - 100 mg/dL    Performed by: Becky Hoang    POCT Glucose    Collection Time: 02/11/23 10:51 AM   Result Value Ref Range    POC Glucose 199 (H) 65 - 100 mg/dL    Performed by: Becky Hoang        I have personally reviewed imaging studies:  Other Studies:  No orders to display       Current Meds:  Current Facility-Administered Medications   Medication Dose Route Frequency    lactated ringers IV soln infusion   IntraVENous Continuous    sodium chloride flush 0.9 % injection 5-40 mL  5-40 mL IntraVENous 2 times per day    sodium chloride flush 0.9 % injection 5-40 mL  5-40 mL IntraVENous PRN    0.9 % sodium chloride infusion   IntraVENous PRN    ondansetron (ZOFRAN-ODT) disintegrating tablet 4 mg  4 mg Oral Q8H PRN    Or    ondansetron (ZOFRAN) injection 4 mg  4 mg IntraVENous Q6H PRN    polyethylene glycol (GLYCOLAX) packet 17 g  17 g Oral Daily PRN    acetaminophen (TYLENOL) tablet 650 mg  650 mg Oral Q6H PRN    Or    acetaminophen (TYLENOL) suppository 650 mg  650 mg Rectal Q6H PRN    atorvastatin (LIPITOR) tablet 40 mg  40 mg Oral Nightly    levothyroxine (SYNTHROID) tablet 88 mcg  88 mcg Oral Daily    melatonin tablet 3 mg  3 mg Oral Nightly PRN    insulin lispro (HUMALOG) injection vial 0-8 Units  0-8 Units SubCUTAneous TID WC    insulin lispro (HUMALOG) injection vial 0-4 Units  0-4 Units SubCUTAneous Nightly    glucose chewable tablet 16 g  4 tablet Oral PRN    dextrose bolus 10% 125 mL  125 mL IntraVENous PRN    Or    dextrose bolus 10% 250 mL  250 mL IntraVENous PRN    glucagon (rDNA) injection 1 mg  1 mg SubCUTAneous PRN    dextrose 10 % infusion   IntraVENous Continuous PRN    hydrALAZINE (APRESOLINE) injection 10 mg  10 mg IntraVENous Q6H PRN    cefTRIAXone (ROCEPHIN) 1,000 mg in sodium chloride 0.9 % 50 mL IVPB (mini-bag)  1,000 mg IntraVENous Q24H    oxyCODONE (ROXICODONE) immediate release tablet 2.5 mg  2.5 mg Oral Q4H PRN    morphine injection 0.5 mg  0.5 mg IntraVENous Q4H PRN    tuberculin injection 5 Units  5 Units IntraDERmal Once       Signed:  Damon French MD    Part of this note may have been written by using a voice dictation software. The note has been proof read but may still contain some grammatical/other typographical errors.

## 2023-02-11 NOTE — PROGRESS NOTES
ACUTE OCCUPATIONAL THERAPY GOALS:   (Developed with and agreed upon by patient and/or caregiver.)  1. Patient will complete lower body bathing and dressing with MODIFIED INDEPENDENCE and adaptive equipment as needed. 2. Patient will complete toileting with MODIFIED INDEPENDENCE. 3. Patient will tolerate 30 minutes of OT treatment with 1-2 rest breaks to increase activity tolerance for ADLs. 4. Patient will complete functional transfers with MODIFIED INDEPENDENCE and adaptive equipment as needed. 5. Patient will complete functional mobility for household distances with MODIFIED INDEPENDENCE  and adaptive equipment as needed. 6. Patient will complete self-grooming while standing edge of sink with MODIFIED INDEPENDENCE and adaptive equipment as needed. Timeframe: 7 visits       OCCUPATIONAL THERAPY Initial Assessment, Daily Note, and AM       OT Visit Days: 1   Acknowledge Orders  Time  OT Charge Capture  [employer] TERENCE Ratliff is a 81 y.o. female   PRIMARY DIAGNOSIS: Urinary tract obstruction by kidney stone  Urinary tract obstruction by kidney stone [N20.0, N13.8]  Procedure(s) (LRB):  CYSTOSCOPY, RIGHT URETEROSCOPY, LASER, LITHO AND STENT / RIGHT RETROGRADE (Right)  1 Day Post-Op  Reason for Referral: Generalized Muscle Weakness (M62.81)  Inpatient: Payor: MEDICARE / Plan: MEDICARE PART A AND B / Product Type: *No Product type* /     ASSESSMENT:     REHAB RECOMMENDATIONS:   Recommendation to date pending progress:  Setting:  Home Health Therapy    Equipment:    To Be Determined     ASSESSMENT:  Ms. Marina Ratliff presents with deficits in overall strength, activity tolerance, activities of daily living performance, and functional mobility. Presents for R uretal stone s/p lithotripsy and stent placement. Alert and very pleasant however [place]. BUE (3+/5) are generally decreased but WFL.  SBA for functional bed mobility/supine <> sit transfer to edge of bed; intact unsupported edge of bed sitting balance. SBA for sit <> stand and functional mobility for household distances. Pt reports already getting up independently to use the bathroom. Overall tolerated well; OOB to chair. At this time, Bhavna Goss is functioning below baseline for activities of daily living and functional mobility. Patient would benefit from skilled OT services to address OT goals and plan of care      [facility] [address] [address] AM-PAC 6 Clicks Daily Activity Inpatient Short Form:    AM-PAC Daily Activity - Inpatient   How much help is needed for putting on and taking off regular lower body clothing?: A Little  How much help is needed for bathing (which includes washing, rinsing, drying)?: A Little  How much help is needed for toileting (which includes using toilet, bedpan, or urinal)?: A Little  How much help is needed for putting on and taking off regular upper body clothing?: A Little  How much help is needed for taking care of personal grooming?: None  How much help for eating meals?: None  AM-[facility] Inpatient Daily Activity Raw Score: 20  AM-PAC Inpatient ADL T-Scale Score : 42.03  ADL Inpatient CMS 0-100% Score: 38.32  ADL Inpatient CMS G-Code Modifier : CJ           SUBJECTIVE:     Ms. Lolita Goss states, \"I got up to use the bathroom by myself earlier today. \"     Social/Functional Lives With: Alone  Type of Home: House  Home Layout: One level  Home Access: Stairs to enter with rails  Home Equipment: Yessy Lau, [email or web address] Help From: Family  Active : Yes  Mode of Transportation: Car  Occupation: Retired  Lives alone however niece lives next door. Independent at baseline for ADLs and functional mobility (uses RW at night to go to the bathroom). Drives short distances during the day but does not drive at night.   OBJECTIVE:     Mayme Jump / Hector Behzad / AIRWAY: None    RESTRICTIONS/PRECAUTIONS:  Restrictions/Precautions: None    PAIN: VITALS / O2:   Pre Treatment:      0/10    Post Treatment: 0 /10       Vitals          Oxygen RA       GROSS EVALUATION: INTACT IMPAIRED   (See Comments)   UE AROM [x] []   UE PROM [x] []   Strength []  Generally weak     Posture / Balance []    intact unsupported edge of bed sitting balance  Fair + static standing balance  Fair + dynamic standing balance   Sensation [x]     Coordination [x]       Tone [x]       Edema [x]    Activity Tolerance []    Generally decreased on RA   Hand Dominance R [] L []      COGNITION/  PERCEPTION: INTACT IMPAIRED   (See Comments)   Orientation [x]     Vision [x]  glasses   Hearing []  Jamul   Cognition  [x]     Perception [x]       MOBILITY: I Mod I S SBA CGA Min Mod Max Total  NT x2 Comments:   Bed Mobility    Rolling [] [] [] [] [] [] [] [] [] [] []    Supine to Sit [] [] [] [x] [] [] [] [] [] [] []    Scooting [] [] [] [x] [] [] [] [] [] [] []    Sit to Supine [] [] [] [] [] [] [] [] [] [] []    Transfers    Sit to Stand [] [] [] [x] [] [] [] [] [] [] []    Bed to Chair [] [] [] [x] [] [] [] [] [] [] []    Stand to Sit [] [] [] [x] [] [] [] [] [] [] []    Tub/Shower [] [] [] [] [] [] [] [] [] [] []     Toilet [] [] [] [] [] [] [] [] [] [] []      [] [] [] [] [] [] [] [] [] [] []    I=Independent, Mod I=Modified Independent, S=Supervision/Setup, SBA=Standby Assistance, CGA=Contact Guard Assistance, Min=Minimal Assistance, Mod=Moderate Assistance, Max=Maximal Assistance, Total=Total Assistance, NT=Not Tested    ACTIVITIES OF DAILY LIVING: I Mod I S SBA CGA Min Mod Max Total NT Comments   BASIC ADLs:              Upper Body Bathing  [] [] [] [] [] [] [] [] [] []    Lower Body Bathing [] [] [] [] [] [] [] [] [] []    Toileting [] [] [] [] [] [] [] [] [] []    Upper Body Dressing [] [] [] [] [] [] [] [] [] []    Lower Body Dressing [] [] [] [] [] [] [] [] [] []    Feeding [] [] [] [] [] [] [] [] [] []    Grooming [] [] [] [] [] [] [] [] [] []    Personal Device Care [] [] [] [] [] [] [] [] [] []    Functional Mobility [] [] [] [x] [] [] [] [] [] []    I=Independent, Mod I=Modified Independent, S=Supervision/Setup, SBA=Standby Assistance, CGA=Contact Guard Assistance, Min=Minimal Assistance, Mod=Moderate Assistance, Max=Maximal Assistance, Total=Total Assistance, NT=Not Tested    PLAN:   [facility] of Care: 3 times/week for duration of hospital stay or until stated goals are met, whichever comes first.    PROBLEM LIST:   (Skilled intervention is medically necessary to address:)  Decreased ADL/Functional Activities  Decreased Activity Tolerance  Decreased Balance  Decreased Gait Ability  Decreased Safety Awareness  Decreased Strength  Decreased Transfer Abilities  Increased Pain   INTERVENTIONS PLANNED:  (Benefits and precautions of occupational therapy have been discussed with the patient.)  Self Care Training  Therapeutic Activity  Therapeutic Exercise/HEP  Neuromuscular Re-education  Manual Therapy  Education         TREATMENT:     EVALUATION: LOW COMPLEXITY: (Untimed Charge)    TREATMENT:   Therapeutic Activity (10 Minutes): Patient participated in therapeutic activities including bed mobility training, functional transfer training, functional mobility of household distances, sitting tolerance activity, and standing tolerance activity with minimal verbal cues in order to increase independence, increase safety awareness, increase activity tolerance, and prepare for discharge home. TREATMENT GRID:  N/A    AFTER TREATMENT PRECAUTIONS: Call light within reach, Chair, Needs within reach, and Visitors at bedside    INTERDISCIPLINARY COLLABORATION:  RN/ PCT and OT/ BENJAMIN    EDUCATION:  Education Given To: Patient; Family  Education Provided: Role of Therapy;Plan of Care  Barriers to Learning: Hearing  Education Outcome: Verbalized understanding;Demonstrated understanding     TOTAL TREATMENT DURATION AND TIME:  Time In: 1022  Time Out: [address] [place] [address]  Minutes: 170 Javed , OT

## 2023-02-12 ENCOUNTER — HOME HEALTH ADMISSION (OUTPATIENT)
Dept: HOME HEALTH SERVICES | Facility: HOME HEALTH | Age: 88
End: 2023-02-12

## 2023-02-12 VITALS
TEMPERATURE: 98.2 F | BODY MASS INDEX: 21.97 KG/M2 | SYSTOLIC BLOOD PRESSURE: 145 MMHG | RESPIRATION RATE: 18 BRPM | WEIGHT: 140 LBS | DIASTOLIC BLOOD PRESSURE: 78 MMHG | HEART RATE: 71 BPM | HEIGHT: 67 IN | OXYGEN SATURATION: 92 %

## 2023-02-12 LAB
ANION GAP SERPL CALC-SCNC: 5 MMOL/L (ref 2–11)
BASOPHILS # BLD: 0 K/UL (ref 0–0.2)
BASOPHILS NFR BLD: 0 % (ref 0–2)
BUN SERPL-MCNC: 20 MG/DL (ref 8–23)
CALCIUM SERPL-MCNC: 8.3 MG/DL (ref 8.3–10.4)
CHLORIDE SERPL-SCNC: 104 MMOL/L (ref 101–110)
CO2 SERPL-SCNC: 32 MMOL/L (ref 21–32)
CREAT SERPL-MCNC: 0.7 MG/DL (ref 0.6–1)
DIFFERENTIAL METHOD BLD: ABNORMAL
EOSINOPHIL # BLD: 0.2 K/UL (ref 0–0.8)
EOSINOPHIL NFR BLD: 3 % (ref 0.5–7.8)
ERYTHROCYTE [DISTWIDTH] IN BLOOD BY AUTOMATED COUNT: 13.9 % (ref 11.9–14.6)
GLUCOSE BLD STRIP.AUTO-MCNC: 126 MG/DL (ref 65–100)
GLUCOSE BLD STRIP.AUTO-MCNC: 158 MG/DL (ref 65–100)
GLUCOSE SERPL-MCNC: 125 MG/DL (ref 65–100)
HCT VFR BLD AUTO: 32 % (ref 35.8–46.3)
HGB BLD-MCNC: 9.7 G/DL (ref 11.7–15.4)
IMM GRANULOCYTES # BLD AUTO: 0 K/UL (ref 0–0.5)
IMM GRANULOCYTES NFR BLD AUTO: 0 % (ref 0–5)
LYMPHOCYTES # BLD: 0.8 K/UL (ref 0.5–4.6)
LYMPHOCYTES NFR BLD: 10 % (ref 13–44)
MCH RBC QN AUTO: 27.2 PG (ref 26.1–32.9)
MCHC RBC AUTO-ENTMCNC: 30.3 G/DL (ref 31.4–35)
MCV RBC AUTO: 89.9 FL (ref 82–102)
MONOCYTES # BLD: 0.8 K/UL (ref 0.1–1.3)
MONOCYTES NFR BLD: 10 % (ref 4–12)
NEUTS SEG # BLD: 6.3 K/UL (ref 1.7–8.2)
NEUTS SEG NFR BLD: 77 % (ref 43–78)
NRBC # BLD: 0 K/UL (ref 0–0.2)
PLATELET # BLD AUTO: 205 K/UL (ref 150–450)
PMV BLD AUTO: 11.4 FL (ref 9.4–12.3)
POTASSIUM SERPL-SCNC: 4.1 MMOL/L (ref 3.5–5.1)
RBC # BLD AUTO: 3.56 M/UL (ref 4.05–5.2)
SERVICE CMNT-IMP: ABNORMAL
SERVICE CMNT-IMP: ABNORMAL
SODIUM SERPL-SCNC: 141 MMOL/L (ref 133–143)
WBC # BLD AUTO: 8.2 K/UL (ref 4.3–11.1)

## 2023-02-12 PROCEDURE — 2580000003 HC RX 258: Performed by: FAMILY MEDICINE

## 2023-02-12 PROCEDURE — 80048 BASIC METABOLIC PNL TOTAL CA: CPT

## 2023-02-12 PROCEDURE — 85025 COMPLETE CBC W/AUTO DIFF WBC: CPT

## 2023-02-12 PROCEDURE — 6370000000 HC RX 637 (ALT 250 FOR IP): Performed by: FAMILY MEDICINE

## 2023-02-12 PROCEDURE — 36415 COLL VENOUS BLD VENIPUNCTURE: CPT

## 2023-02-12 PROCEDURE — 82962 GLUCOSE BLOOD TEST: CPT

## 2023-02-12 RX ORDER — CEFPODOXIME PROXETIL 200 MG/1
200 TABLET, FILM COATED ORAL 2 TIMES DAILY
Qty: 4 TABLET | Refills: 0 | Status: SHIPPED | OUTPATIENT
Start: 2023-02-12 | End: 2023-02-12 | Stop reason: SDUPTHER

## 2023-02-12 RX ORDER — CEFPODOXIME PROXETIL 200 MG/1
200 TABLET, FILM COATED ORAL 2 TIMES DAILY
Qty: 4 TABLET | Refills: 0 | Status: SHIPPED | OUTPATIENT
Start: 2023-02-12 | End: 2023-02-14

## 2023-02-12 RX ADMIN — SODIUM CHLORIDE, PRESERVATIVE FREE 10 ML: 5 INJECTION INTRAVENOUS at 09:39

## 2023-02-12 RX ADMIN — LEVOTHYROXINE SODIUM 88 MCG: 0.09 TABLET ORAL at 09:34

## 2023-02-12 NOTE — CARE COORDINATION
Patient is medically stable for discharge. PT evaluated patient and recommended [facility] for patient. CM met with patient to speak about recommendation. Patient agreeable to recommendation - with no preference in agency, referral will be sent to [facility] [place], operated by [facility]. Order placed for RN/PT/OT. Referral sent to [facility] [place]. No other needs identified at this time. 2:30 - CM received phone call from [place] [phone] with [facility] [place] advising that they don't have any nurses available. Referral was then sent to [facility] via [address]. ASSESSMENT NOTE    Attending Physician: Irma Haynes MD  Admit Problem: Urinary tract obstruction by kidney stone [N20.0, N13.8]  Date/Time of Admission: 2/9/2023  5:25 PM  Problem List:  Patient Active Problem List   Diagnosis    Urinary tract obstruction by kidney stone    Diabetes mellitus, type II ([address]    HLD (hyperlipidemia)    Hypothyroidism       Service Assessment  Patient Orientation Alert and Oriented, Place, Person, Situation   Cognition Alert   History Provided By Patient, Medical Record   Primary Caregiver Self   Accompanied By/Relationship     Support Systems Family Members, Friends/Neighbors   Patient's Healthcare Decision Maker is: Legal Next of Ellie 69 (Pt's sibilings (4 Living- per patient))   PCP Verified by CM Yes   Last Visit to PCP Within last 6 months   Prior Functional Level Independent in ADLs/IADLs   Current Functional Level Other (see comment) (pending clinical course.)   Can patient return to prior living arrangement Unknown at present   Ability to make needs known: Good   Family able to assist with home care needs: Yes (Pt's niece Castillo Henry is supportive with pt's care needs.)   Would you like for me to discuss the discharge plan with any other family members/significant others, and if so, who?      Financial Resources [employer]     CM/SW Referral       Social/Functional History  Lives With Alone   Type of [address] One level   [address] to enter with rails Entrance Stairs - Number of Steps     Entrance Stairs - Rails     Bathroom Shower/Tub     Bathroom Toilet     Bathroom Equipment     Bathroom Accessibility     Home Equipment Walker, [address] Help From Family   ADL Assistance     [employer]     Grooming     Feeding     Toileting     [address]     Meal Prep     Laundry     Vacuuming     Cleaning     Gardening     Yard Work     Driving     Shopping          Other (Comment)     Homemaking Responsibilities     Meal Prep Responsibility     [address] Paying/Finance Responsibility     [address] Management     Other (Comment)     Ambulation Assistance     Transfer Assistance     Active  Yes   Patient's  Info     Mode of Transportation Car   Education     Occupation Retired   Type of Occupation       Discharge Planning   Type of [address] Family Members, Friends/Neighbors   Current Services Prior To Admission None   [address]   DME     DME     DME Ordered? Potential Assistance Purchasing Medications No (Patient is able to afford prescription medications.)   Meds-to-Beds: Does the patient want to have any new prescriptions delivered to bedside prior to discharge? Type of Home Care Services None   Patient expects to be discharged to: Unknown   Follow Up Appointment: Best Day/Time     One/Two Story Residence:     # of Interior Steps     Height of Each Step (in)     [employer] Available     History of Falls?        Services At/After Discharge  Transition of Care Consult (CM Consult): Discharge Planning   Internal Home Health     Internal Hospice     Reason Outside Agency [address]     Partner SNF     Reason Why Partner SNF Not Chosen     Internal Comfort Care     Reason Outside [facility] At/After Discharge     [employer] Information Provided? No   Mode of Transport at Discharge Other (see comment) (TBD, based upon need.)   Hospital Transport Time of Discharge     Confirm Follow Up Transport Self     Condition of Participation: Discharge Planning  The plan for Transition of Care is related to the following treatment goals: pending clinical course. The Patient and/or Patient Representative was provided with a Choice of Provider? Name of the Patient Representative who was provided with the Choice of Provider and agrees with the Discharge Plan? The Patient and/or Patient Representative Agree with the Discharge Plan? Freedom of Choice list was provided with basic dialogue that supports the individualized plan of care/goals, treatment preferences, and shares the quality data associated with the providers?        Documentation for Discharge Appeal  Discharge Appealed by     Date notified by [facility] of appeal request:     Time notified by [facility] of appeal request:     Detailed Notice of Discharge given to:     Date Notice of Discharge given:     Time Notice of Discharge given:     Date records sent to [facility]     Time records sent to [address]     Date Notified of Outcome     Time Notified of Outcome     Outcome of appeal           [address] 02/12/23 2:26 PM,deana

## 2023-02-12 NOTE — PROGRESS NOTES
Patient alert and oriented x 4 , denies pain at this time. Hourly rounds performed all needs met bed locked low call light within reach. Will continue to monitor care and will give report to on coming RN.

## 2023-02-12 NOTE — PLAN OF CARE
Problem: Discharge Planning  Goal: Discharge to home or other facility with appropriate resources  2/12/2023 1203 by Catrina Nicholas RN  Outcome: Adequate for Discharge  2/12/2023 1203 by Catrina Nicholas RN  Outcome: Adequate for Discharge     Problem: Safety - Adult  Goal: Free from fall injury  2/12/2023 1203 by Catrina Nicholas RN  Outcome: Adequate for Discharge  2/12/2023 1203 by Catrina Nicholas RN  Outcome: Adequate for Discharge     Problem: ABCDS Injury Assessment  Goal: Absence of physical injury  2/12/2023 1203 by Catrina Nicholas RN  Outcome: Adequate for Discharge  2/12/2023 1203 by Catrina Nicholas RN  Outcome: Adequate for Discharge     Problem: Pain  Goal: Verbalizes/displays adequate comfort level or baseline comfort level  2/12/2023 1203 by Catrina Nicholas RN  Outcome: Adequate for Discharge  2/12/2023 1203 by Catrina Nicholas RN  Outcome: Adequate for Discharge     Problem: Chronic Conditions and Co-morbidities  Goal: Patient's chronic conditions and co-morbidity symptoms are monitored and maintained or improved  2/12/2023 1203 by Catrina Nicholas RN  Outcome: Adequate for Discharge  2/12/2023 1203 by Catrina Nicholas RN  Outcome: Adequate for Discharge

## 2023-02-12 NOTE — DISCHARGE SUMMARY
Hospitalist Discharge Summary   Admit Date:  2023  5:25 PM   DC Note date: 2023  Name:  Nella Fajardo   Age:  81 y.o. Sex:  female  :  1935   MRN:  [id number]   Room:  [facility]  PCP:  Rhett Odell MD    Presenting Complaint: No chief complaint on file. Initial Admission Diagnosis: Urinary tract obstruction by kidney stone [N20.0, N13.8]     Problem List for this Hospitalization (present on admission):    Principal Problem:    Urinary tract obstruction by kidney stone  Active Problems:    Diabetes mellitus, type II ([address]    HLD (hyperlipidemia)    Hypothyroidism  Resolved Problems:    * No resolved hospital problems. [facility] Course:     Patient is a 81 y.o. female who presented to [facility] ER for cc right flank pain. I cannot obtain hx well due to patient being severely hearing impaired. She is even having difficulty understanding her brother in law. Hx of HLD, hypothyroidism, HTN, DM type II, and hearing impairment.      CT abdomen pelvis with contrast -   A 5-6 mm stone upper right ureter with mild hydronephrosis and   small perinephric urinoma. Urinary tract obstruction by kidney stone: Urology was consulted and patient underwent lithotripsy with right ureteral stent placement on 2/10/23 by Dr. Florian Dalal. Plan is for to keep the stent for 1 week. She will follow up with Urology as an outpatient. She will also complete a 5 day course of antibiotics on Vantin as an outpatient. Disposition: Home  Diet: ADULT DIET; Regular  Code Status: Full Code    Follow Ups:   Follow-up Information     Melodie Glasgow MD. Schedule an appointment as soon as possible for a   visit in 1 week(s). Specialty: Urology  Why: Ureteral stent placed for kidney stone  Contact information:  [address] 647 [phone]                       Time spent in patient discharge and coordination 33 minutes.         Follow up labs/diagnostics (ultimately defer to outpatient provider):  None    Plan was discussed with patient. All questions answered. Patient was stable at time of discharge. Instructions given to call a physician or return if any concerns. Current Discharge Medication List        START taking these medications    Details   cefpodoxime (VANTIN) 200 MG tablet Take 1 tablet by mouth 2 times daily for 2 days  Qty: 4 tablet, Refills: 0           CONTINUE these medications which have NOT CHANGED    Details   atorvastatin (LIPITOR) 40 MG tablet Take 40 mg by mouth daily      Cholecalciferol 50 MCG (2000 UT) TABS Strength: 2000 intl units; Form: tablet; SIG: take 1 tab(s) orally twice a day for 30 day(s)      cyanocobalamin 1000 MCG tablet Take 1,000 mcg by mouth daily      Hyoscyamine Sulfate SL 0.125 MG SUBL Place 0.125 mg under the tongue 4 times daily as needed      levothyroxine (SYNTHROID) 88 MCG tablet Take 88 mcg by mouth daily      lisinopril (PRINIVIL;ZESTRIL) 2.5 MG tablet Take 2.5 mg by mouth daily      melatonin 3 MG TABS tablet Take 3 mg by mouth      metFORMIN (GLUCOPHAGE) 500 MG tablet Take by mouth      niacin 500 MG tablet Strength: 500 mg; Form: ; SIG: take 1 tab  2 times a day             Some medications may have been reported old/obsolete and marked \"stop taking\" by the system; in reality pt was already off these meds; defer to outpatient or prescribing providers. Procedures done this admission:  Procedure(s):  CYSTOSCOPY, RIGHT URETEROSCOPY, LASER, LITHO AND STENT / RIGHT RETROGRADE    Consults this admission:  IP CONSULT TO UROLOGY    Echocardiogram results:  No results found for this or any previous visit. Diagnostic Imaging/Tests:   CT ABDOMEN PELVIS W IV CONTRAST Additional Contrast? Oral    Result Date: 2/9/2023  A 5-6 mm stone upper right ureter with mild hydronephrosis and small perinephric urinoma.        Labs: Results:       BMP, Mg, Phos Recent Labs     02/10/23  0616 02/11/23  0656 02/12/23  0452   NA 141 140 141   K 3.8 4.3 4.1    105 104   CO2 27 30 32   ANIONGAP 11 5 5   BUN 19 21 20   CREATININE 1.00 0.90 0.70   LABGLOM 55* >60 >60   CALCIUM 8.8 8.5 8.3   GLUCOSE 150* 174* 125*      CBC Recent Labs     02/10/23  0616 02/11/23  0656 02/12/23  0452   WBC 7.3 12.7* 8.2   RBC 3.93* 3.71* 3.56*   HGB 10.6* 10.2* 9.7*   HCT 35.4* 33.3* 32.0*   MCV 90.1 89.8 89.9   MCH 27.0 27.5 27.2   MCHC 29.9* 30.6* 30.3*   RDW 13.8 14.3 13.9    228 205   MPV 10.0 11.1 11.4   NRBC 0.00 0.00 0.00   SEGS 94* 84* 77   LYMPHOPCT 2* 7* 10*   EOSRELPCT 2 0* 3   MONOPCT 1* 8 10   BASOPCT 1 0 0   IMMGRAN 1 1 0   SEGSABS 6.8 10.8* 6.3   LYMPHSABS 0.1* 0.8 0.8   EOSABS 0.2 0.0 0.2   MONOSABS 0.1 1.0 0.8   BASOSABS 0.0 0.0 0.0   ABSIMMGRAN 0.0 0.1 0.0      LFT No results for input(s): BILITOT, BILIDIR, ALKPHOS, AST, ALT, PROT, LABALBU, GLOB in the last 72 hours. Cardiac  No results found for: NTPROBNP, TROPHS   Coags No results found for: PROTIME, INR, APTT   A1c No results found for: LABA1C, EAG   Lipids No results found for: CHOL, LDLCALC, LABVLDL, HDL, CHOLHDLRATIO, TRIG   Thyroid  No results found for: Gayle Antunez     Most Recent UA Lab Results   Component Value Date/Time    COLORU YELLOW 02/09/2023 10:55 AM    APPEARANCE CLEAR 02/09/2023 10:55 AM    SPECGRAV 1.025 02/09/2023 10:55 AM    LABPH 7.5 02/09/2023 10:55 AM    PROTEINU 30 02/09/2023 10:55 AM    GLUCOSEU Negative 02/09/2023 10:55 AM    KETUA Negative 02/09/2023 10:55 AM    BILIRUBINUR Negative 02/09/2023 10:55 AM    BLOODU LARGE 02/09/2023 10:55 AM    UROBILINOGEN 1.0 02/09/2023 10:55 AM    NITRU Negative 02/09/2023 10:55 AM    LEUKOCYTESUR SMALL 02/09/2023 10:55 AM    WBCUA 20-50 02/09/2023 10:55 AM    RBCUA  02/09/2023 10:55 AM    EPITHUA 0-3 02/09/2023 10:55 AM    BACTERIA 1+ 02/09/2023 10:55 AM    LABCAST 0 02/09/2023 10:55 AM    MUCUS 0 02/09/2023 10:55 AM        No results for input(s): CULTURE in the last 720 hours.     All Labs from Last 24 Hrs:  Recent Results (from the past 24 hour(s))   POCT Glucose    Collection Time: 02/11/23  4:12 PM   Result Value Ref Range    POC Glucose 160 (H) 65 - 100 mg/dL    Performed by: Feliz Martin    POCT Glucose    Collection Time: 02/11/23  9:32 PM   Result Value Ref Range    POC Glucose 174 (H) 65 - 100 mg/dL    Performed by: [facility]    Basic Metabolic Panel w/ Reflex to MG    Collection Time: 02/12/23  4:52 AM   Result Value Ref Range    Sodium 141 133 - 143 mmol/L    Potassium 4.1 3.5 - 5.1 mmol/L    Chloride 104 101 - 110 mmol/L    CO2 32 21 - 32 mmol/L    Anion Gap 5 2 - 11 mmol/L    Glucose 125 (H) 65 - 100 mg/dL    BUN 20 8 - 23 MG/DL    Creatinine 0.70 0.6 - 1.0 MG/DL    Est, Glom Filt Rate >60 >60 ml/min/1.73m2    Calcium 8.3 8.3 - 10.4 MG/DL   CBC with Auto Differential    Collection Time: 02/12/23  4:52 AM   Result Value Ref Range    WBC 8.2 4.3 - 11.1 K/uL    RBC 3.56 (L) 4.05 - 5.2 M/uL    Hemoglobin 9.7 (L) 11.7 - 15.4 g/dL    Hematocrit 32.0 (L) 35.8 - 46.3 %    MCV 89.9 82 - 102 FL    MCH 27.2 26.1 - 32.9 PG    MCHC 30.3 (L) 31.4 - 35.0 g/dL    RDW 13.9 11.9 - 14.6 %    Platelets 065 522 - 401 K/uL    MPV 11.4 9.4 - 12.3 FL    nRBC 0.00 0.0 - 0.2 K/uL    Differential Type AUTOMATED      Seg Neutrophils 77 43 - 78 %    Lymphocytes 10 (L) 13 - 44 %    Monocytes 10 4.0 - 12.0 %    Eosinophils % 3 0.5 - 7.8 %    Basophils 0 0.0 - 2.0 %    Immature Granulocytes 0 0.0 - 5.0 %    Segs Absolute 6.3 1.7 - 8.2 K/UL    Absolute Lymph # 0.8 0.5 - 4.6 K/UL    Absolute Mono # 0.8 0.1 - 1.3 K/UL    Absolute Eos # 0.2 0.0 - 0.8 K/UL    Basophils Absolute 0.0 0.0 - 0.2 K/UL    Absolute Immature Granulocyte 0.0 0.0 - 0.5 K/UL   POCT Glucose    Collection Time: 02/12/23  7:20 AM   Result Value Ref Range    POC Glucose 126 (H) 65 - 100 mg/dL    Performed by: Taty    POCT Glucose    Collection Time: 02/12/23 11:22 AM   Result Value Ref Range    POC Glucose 158 (H) 65 - 100 mg/dL Performed by: Slick Nam        No Known Allergies  Immunization History   Administered Date(s) Administered    COVID-19, MODERNA BLUE border, Primary or Immunocompromised, (age 12y+), IM, 100 mcg/0.5mL 01/18/2021, 02/15/2021, 12/23/2021       Recent Vital Data:  Patient Vitals for the past 24 hrs:   Temp Pulse Resp BP SpO2   02/12/23 1120 98.2 °F (36.8 °C) 71 18 (!) 145/78 92 %   02/12/23 0717 98.1 °F (36.7 °C) 65 18 125/68 93 %   02/12/23 0412 98.2 °F (36.8 °C) 65 16 118/62 92 %   02/12/23 0311 98.1 °F (36.7 °C) 66 14 (!) 116/56 --   02/11/23 2337 98.1 °F (36.7 °C) 66 16 (!) 116/56 96 %   02/11/23 1943 99.7 °F (37.6 °C) 79 16 134/71 93 %   02/11/23 1516 98.2 °F (36.8 °C) 84 20 (!) 118/92 99 %       Oxygen Therapy  SpO2: 92 %  Pulse via Oximetry: 83 beats per minute  Pulse Oximeter Device Mode: Continuous  Pulse Oximeter Device Location: Left, Finger  O2 Device: None (Room air)  O2 Flow Rate (L/min): 3 L/min    Estimated body mass index is 21.93 kg/m² as calculated from the following:    Height as of this encounter: 5' 7\" (1.702 m). Weight as of this encounter: 140 lb (63.5 kg). No intake or output data in the 24 hours ending 02/12/23 1201      Physical Exam:    General:    Well nourished. No overt distress  Head:  Normocephalic, atraumatic  Eyes:  Sclerae appear normal.  Pupils equally round. HENT:  Nares appear normal, no drainage. Moist mucous membranes. [place]  Neck:  No restricted ROM. Trachea midline  CV:   RRR. No m/r/g. No JVD  Lungs:   CTAB. No wheezing, rhonchi, or rales. Respirations even, unlabored  Abdomen:   Soft, nontender, nondistended. Extremities: Warm and dry. No cyanosis or clubbing. No edema. Skin:     No rashes. Normal coloration  Neuro:  CN II-XII grossly intact. Psych:  Normal mood and affect. Signed:  Lai Valverde MD    Part of this note may have been written by using a voice dictation software.   The note has been proof read but may still contain some grammatical/other typographical errors.

## 2023-02-20 ENCOUNTER — PROCEDURE VISIT (OUTPATIENT)
Dept: UROLOGY | Age: 88
End: 2023-02-20
Payer: MEDICARE

## 2023-02-20 DIAGNOSIS — N20.0 URINARY TRACT OBSTRUCTION BY KIDNEY STONE: Primary | ICD-10-CM

## 2023-02-20 DIAGNOSIS — N13.30 HYDRONEPHROSIS OF RIGHT KIDNEY: ICD-10-CM

## 2023-02-20 DIAGNOSIS — N13.8 URINARY TRACT OBSTRUCTION BY KIDNEY STONE: Primary | ICD-10-CM

## 2023-02-20 LAB
BILIRUBIN, URINE, POC: NEGATIVE
BLOOD URINE, POC: NORMAL
GLUCOSE URINE, POC: NEGATIVE
KETONES, URINE, POC: NEGATIVE
LEUKOCYTE ESTERASE, URINE, POC: NORMAL
NITRITE, URINE, POC: NEGATIVE
PH, URINE, POC: 6 (ref 4.6–8)
PROTEIN,URINE, POC: 100
SPECIFIC GRAVITY, URINE, POC: 1.02 (ref 1–1.03)
URINALYSIS CLARITY, POC: NORMAL
URINALYSIS COLOR, POC: NORMAL
UROBILINOGEN, POC: NORMAL

## 2023-02-20 PROCEDURE — 52310 CYSTOSCOPY AND TREATMENT: CPT | Performed by: UROLOGY

## 2023-02-20 PROCEDURE — 81003 URINALYSIS AUTO W/O SCOPE: CPT | Performed by: UROLOGY

## 2023-02-20 NOTE — PROGRESS NOTES
[facility]  [address]   [address]  [facility], [address] [facility]  [phone]    [address]  : 1935    HPI   81 y.o.  female who presents for cystoscopy and R ureter stent removal.    She is s/p R URS/LL 23 for obstructing R ureter stone + forniceal rupture. Today, she reports doing well. No issues except normal stent discomfort. History reviewed. No pertinent past medical history. Past Surgical History:   Procedure Laterality Date    BLADDER SURGERY Right 2/10/2023    CYSTOSCOPY, RIGHT URETEROSCOPY, LASER, LITHO AND STENT / RIGHT RETROGRADE performed by Renae Schaefer MD at [facility] MAIN OR     Current Outpatient Medications   Medication Sig Dispense Refill    atorvastatin (LIPITOR) 40 MG tablet Take 40 mg by mouth daily      Cholecalciferol 50 MCG (2000 UT) TABS Strength: 2000 intl units; Form: tablet; SIG: take 1 tab(s) orally twice a day for 30 day(s)      cyanocobalamin 1000 MCG tablet Take 1,000 mcg by mouth daily      Hyoscyamine Sulfate SL 0.125 MG SUBL Place 0.125 mg under the tongue 4 times daily as needed      levothyroxine (SYNTHROID) 88 MCG tablet Take 88 mcg by mouth daily      lisinopril (PRINIVIL;ZESTRIL) 2.5 MG tablet Take 2.5 mg by mouth daily      melatonin 3 MG TABS tablet Take 3 mg by mouth      metFORMIN (GLUCOPHAGE) 500 MG tablet Take by mouth      niacin 500 MG tablet Strength: 500 mg; Form: ; SIG: take 1 tab  2 times a day       No current facility-administered medications for this visit.      Allergies   Allergen Reactions    Hydrochlorothiazide Other (See Comments)     MUSCLE ACHES     Social History     Socioeconomic History    Marital status:      Spouse name: Not on file    Number of children: Not on file    Years of education: Not on file    Highest education level: Not on file   Occupational History    Not on file   Tobacco Use    Smoking status: Not on file    Smokeless tobacco: Not on file   Substance and Sexual Activity Alcohol use: Not on file    Drug use: Not on file    Sexual activity: Not on file   Other Topics Concern    Not on file   Social History Narrative    Not on file     Social Determinants of Health     Financial Resource Strain: Not on file   Food Insecurity: Not on file   Transportation Needs: Not on file   Physical Activity: Not on file   Stress: Not on file   Social Connections: Not on file   Intimate Partner Violence: Not on file   Housing Stability: Not on file     History reviewed. No pertinent family history. There were no vitals taken for this visit. UA - Dipstick  Results for orders placed or performed in visit on 02/20/23   AMB POC URINALYSIS DIP STICK AUTO W/O MICRO   Result Value Ref Range    Color (UA POC)      Clarity (UA POC)      Glucose, Urine, POC Negative Negative    Bilirubin, Urine, POC Negative Negative    KETONES, Urine, POC Negative Negative    Specific Gravity, Urine, POC 1.025 1.001 - 1.035    Blood (UA POC) Trace Negative    pH, Urine, POC 6.0 4.6 - 8.0    Protein, Urine,  Negative    Urobilinogen, POC 0.2 mg/dL     Nitrite, Urine, POC Negative Negative    Leukocyte Esterase, Urine, POC Small Negative       UA - Micro  WBC - 0  RBC - 0  Bacteria - 0  Epith - 0    There were no vitals taken for this visit. GENERAL: No acute distress, Awake, Alert, Oriented X 3, Gait normal  CARDIAC: regular rate and rhythm  CHEST AND LUNG: Easy work of breathing, clear to auscultation bilaterally, no cyanosis  ABDOMEN: soft, non tender, non-distended, positive bowel sounds, no organomegaly, no palpable masses, no guarding, no rebound tenderness  SKIN: No rash, no erythema, no lacerations or abrasions, no ecchymosis  NEUROLOGIC: cranial nerves 2-12 grossly intact       Cystoscopy Procedure:     Procedure Room  1  Scope ID:       [id number]  Assistant:      AL    All risks, benefits and alternatives were again reviewed with patient and she is willing to proceed at this time.   Her genital area was prepped and draped and a sterile field applied. 2% lidocaine jelly was injected in the the urethra and allowed to dwell for several minutes. A flexible cystoscope was then inserted into the urethral meatus and advanced under direct vision. The urethra appeared normal with no obstructions. The bladder neck was open without scarring, contractures, frons or tumors present. The bladder was systematically surveyed. No bladder trabeculations were seen. No mucosal abnormalities were seen. The ureteral orifices were seen in their normal orthotopic position. Flexible stent grasper inserted and stent grasped. Stent removed completely intact. The cystoscope was then removed under direct vision. The patient tolerated the procedure well. Assessment and Plan    ICD-10-CM    1. Urinary tract obstruction by kidney stone  N20.0 IN CYSTO W/SIMPLE REMOVAL STONE & STENT    N13.8 AMB POC URINALYSIS DIP STICK AUTO W/O MICRO      2. Hydronephrosis of right kidney  N13.30 US RETROPERITONEAL COMPLETE        Orders Placed This Encounter   Procedures    US RETROPERITONEAL COMPLETE           Standing Status:   Future     Standing Expiration Date:   8/20/2024     Order Specific Question:   Reason for exam:     Answer:   Evaluate for resolution of hydronephrosis after ureteroscopy. AMB POC URINALYSIS DIP STICK AUTO W/O MICRO    IN CYSTO W/SIMPLE REMOVAL STONE & STENT     Right stent removed today. Tolerated well. Follow up 6 weeks with renal us prior to ensure no urinoma or residual hydronephrosis after procedure. Arnaud Anderson M.D.     [facility]  [address], [address] [facility]  Phone: [phone]  Fax: [phone]

## 2023-04-17 ENCOUNTER — OFFICE VISIT (OUTPATIENT)
Dept: UROLOGY | Age: 88
End: 2023-04-17
Payer: MEDICARE

## 2023-04-17 DIAGNOSIS — N13.30 HYDRONEPHROSIS OF RIGHT KIDNEY: ICD-10-CM

## 2023-04-17 DIAGNOSIS — N20.0 URINARY TRACT OBSTRUCTION BY KIDNEY STONE: Primary | ICD-10-CM

## 2023-04-17 DIAGNOSIS — N13.8 URINARY TRACT OBSTRUCTION BY KIDNEY STONE: Primary | ICD-10-CM

## 2023-04-17 PROCEDURE — 1090F PRES/ABSN URINE INCON ASSESS: CPT | Performed by: UROLOGY

## 2023-04-17 PROCEDURE — 1123F ACP DISCUSS/DSCN MKR DOCD: CPT | Performed by: UROLOGY

## 2023-04-17 PROCEDURE — G8427 DOCREV CUR MEDS BY ELIG CLIN: HCPCS | Performed by: UROLOGY

## 2023-04-17 PROCEDURE — 99213 OFFICE O/P EST LOW 20 MIN: CPT | Performed by: UROLOGY

## 2023-04-17 PROCEDURE — G8420 CALC BMI NORM PARAMETERS: HCPCS | Performed by: UROLOGY

## 2023-04-17 PROCEDURE — 4004F PT TOBACCO SCREEN RCVD TLK: CPT | Performed by: UROLOGY

## 2023-04-17 ASSESSMENT — ENCOUNTER SYMPTOMS
BACK PAIN: 0
NAUSEA: 0

## 2023-04-17 NOTE — PROGRESS NOTES
[facility]  [address] [facility]    [address]  [place], [address] [facility]  [phone]          [address]  : 1935    Chief Complaint   Patient presents with    Follow-up          HPI     Cesia PRATT Chace Jacobs is a 81 y.o. female with a PMH of R kidney stones who returns for follow up. She is s/p R URS/LL 23 for obstructing R ureter stone + forniceal rupture. Stent removed 23. Today, she returns to review stone analysis as well as have renal US of her kidney to ensure no hydronephrosis after procedure. Of note, she is very hard of hearing. Presents with family member today. History reviewed. No pertinent past medical history. Past Surgical History:   Procedure Laterality Date    BLADDER SURGERY Right 2/10/2023    CYSTOSCOPY, RIGHT URETEROSCOPY, LASER, LITHO AND STENT / RIGHT RETROGRADE performed by Matias Botello MD at [facility] MAIN OR     Current Outpatient Medications   Medication Sig Dispense Refill    atorvastatin (LIPITOR) 40 MG tablet Take 1 tablet by mouth daily      Cholecalciferol 50 MCG (2000 UT) TABS Strength: 2000 intl units; Form: tablet; SIG: take 1 tab(s) orally twice a day for 30 day(s)      cyanocobalamin 1000 MCG tablet Take 1 tablet by mouth daily      levothyroxine (SYNTHROID) 88 MCG tablet Take 1 tablet by mouth daily      lisinopril (PRINIVIL;ZESTRIL) 2.5 MG tablet Take 1 tablet by mouth daily      melatonin 3 MG TABS tablet Take 1 tablet by mouth      niacin 500 MG tablet Strength: 500 mg; Form: ; SIG: take 1 tab  2 times a day      Hyoscyamine Sulfate SL 0.125 MG SUBL Place 0.125 mg under the tongue 4 times daily as needed (Patient not taking: Reported on 2023)      metFORMIN (GLUCOPHAGE) 500 MG tablet Take by mouth       No current facility-administered medications for this visit.      Allergies   Allergen Reactions    Hydrochlorothiazide Other (See Comments)     MUSCLE ACHES     Social History     Socioeconomic History    Marital status:

## (undated) DEVICE — GARMENT,MEDLINE,DVT,INT,CALF,MED, GEN2: Brand: MEDLINE

## (undated) DEVICE — CATHETER URET 5FR L70CM OPN END SGL LUMN INJ HUB FLEXIMA

## (undated) DEVICE — SINGLE-USE DIGITAL FLEXIBLE URETEROSCOPE: Brand: LITHOVUE

## (undated) DEVICE — URETERAL ACCESS SHEATH SET: Brand: NAVIGATOR HD

## (undated) DEVICE — SOLUTION IRRIG 3000ML H2O STRL BAG

## (undated) DEVICE — NITINOL STONE RETRIEVAL DEVICE: Brand: DAKOTA

## (undated) DEVICE — GUIDEWIRE UROLOGICAL STR 3 CM 0.038 INX150 CM DUAL-FLEX

## (undated) DEVICE — PACK SURGICAL PROCEDURE KIT CYSTOSCOPY TOTE

## (undated) DEVICE — FLEXIVA  PULSE  AND  FLEXIVA  PULSE  TRACTIP  LASER  FIBERS  ARE  HIGH  POWER  SINGLE-USE FIBER: Brand: FLEXIVA PULSE